# Patient Record
Sex: FEMALE | Race: WHITE | Employment: FULL TIME | URBAN - METROPOLITAN AREA
[De-identification: names, ages, dates, MRNs, and addresses within clinical notes are randomized per-mention and may not be internally consistent; named-entity substitution may affect disease eponyms.]

---

## 2019-08-07 ENCOUNTER — TELEPHONE (OUTPATIENT)
Dept: INTERNAL MEDICINE CLINIC | Age: 54
End: 2019-08-07

## 2019-08-07 NOTE — TELEPHONE ENCOUNTER
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 She called 6 months ago to see if we were accepting new pts and was told to call back in 6 months. She is still unable to get an appointment. She informed me her current PCP is retiring and she would really like to establish care with Dr. Gurmeet Dias. Please let me know if she can be set up for a NP appt. Thanks.

## 2020-03-18 ENCOUNTER — OFFICE VISIT (OUTPATIENT)
Dept: INTERNAL MEDICINE CLINIC | Age: 55
End: 2020-03-18

## 2020-03-18 VITALS
TEMPERATURE: 98.4 F | HEIGHT: 68 IN | DIASTOLIC BLOOD PRESSURE: 72 MMHG | WEIGHT: 136 LBS | RESPIRATION RATE: 12 BRPM | BODY MASS INDEX: 20.61 KG/M2 | OXYGEN SATURATION: 97 % | SYSTOLIC BLOOD PRESSURE: 128 MMHG | HEART RATE: 72 BPM

## 2020-03-18 DIAGNOSIS — Z00.00 WELL ADULT EXAM: Primary | ICD-10-CM

## 2020-03-18 RX ORDER — TAMOXIFEN CITRATE 20 MG/1
TABLET ORAL
COMMUNITY
Start: 2020-03-02

## 2020-03-18 RX ORDER — CHOLECALCIFEROL (VITAMIN D3) 50 MCG
CAPSULE ORAL
COMMUNITY

## 2020-03-18 RX ORDER — CHOLECALCIFEROL (VITAMIN D3) 125 MCG
CAPSULE ORAL
COMMUNITY

## 2020-03-18 NOTE — PROGRESS NOTES
Perla Murillo is a 48 y.o. female and presents with [facility]  . Subjective:  Patient presents to [facility]. Her prior primary care doctor moved. She reports overall good health in general.  She was diagnosed with breast cancer 2 years ago. Burning sensation in mouth  Patient is taking B12 complex  She also had a full work-up by ear nose and throat Dr. Gage Bey and then also was seen by Dr. Tono Vega did endoscopy. She had a negative evaluation and was started on Prilosec. She did Prilosec for 2 months but symptoms did not improve. She is just dealing with her symptoms. She notes that she has a persistent dry mouth. History of left breast cancer  Patient is getting mammogram every 6 months and MRI  Dr. Ari Bailon was seen by dr. Edenilson Espitia. She is taking buspar although prefers not to. She notes that it does take the edge off. 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 hoarseness  Respiratory:  negative for cough, hemoptysis, dyspnea,wheezing  CV:   negative for chest pain, palpitations, lower extremity edema  GI:   negative for nausea, vomiting, diarrhea, abdominal pain,melena  Endo:               negative for polyuria,polydipsia,polyphagia,heat intolerance  Genitourinary: negative for frequency, dysuria and hematuria  Integument:  negative for rash and pruritus  Hematologic:  negative for easy bruising and gum/nose bleeding  Musculoskel: negative for myalgias, arthralgias, back pain, muscle weakness, joint pain  Neurological:  negative for headaches, dizziness, vertigo, memory problems and gait   Behavl/Psych: negative for feelings of anxiety, depression, mood changes    Past Medical History:   Diagnosis Date    H/O endoscopy 2020    Hiatal hernia     History of breast cancer 2018    left breast    Weight decrease      Past Surgical History:   Procedure Laterality Date    HX BREAST LUMPECTOMY Left 2018    HX COLONOSCOPY  2017     Social History     Socioeconomic History    Marital status:      Spouse name: Not on file    Number of children: Not on file    Years of education: Not on file    Highest education level: Not on file   Tobacco Use    Smoking status: Never Smoker    Smokeless tobacco: Never Used   Substance and Sexual Activity    Alcohol use: No     Frequency: Never    Drug use: No    Sexual activity: Yes     Partners: Male     Family History   Problem Relation Age of Onset    Hypertension Mother     High Cholesterol Mother     Arthritis-osteo Mother     Hypertension Father     High Cholesterol Father     Prostate Cancer Father     Cancer Maternal Grandmother         lung    Cancer Paternal Grandmother         pancreatic    Stroke Maternal Grandfather      Current Outpatient Medications   Medication Sig Dispense Refill    tamoxifen (NOLVADEX) 20 mg tablet       cholecalciferol, vitamin D3, (Vitamin D3) 50 mcg (2,000 unit) tab Take  by mouth.  omega 3-dha-epa-fish oil (Fish Oil) 100-160-1,000 mg cap Take  by mouth.  b complex-vitamin c-folic acid (DIALYVITE) 100-1 mg tab tablet Take 1 Tab by mouth daily.  buspirone HCl (BUSPAR PO) Take 5 mg by mouth two (2) times a day.  PARoxetine (PAXIL) 30 mg tablet Take 30 mg by mouth nightly. Indications: not taking      phenol throat spray (CHLORASEPTIC THROAT SPRAY) 1.4 % spray Take 1 Spray by mouth as needed. (Patient taking differently: Take 1 Spray by mouth as needed. Indications: not taking) 180 mL 0    HYDROcodone-acetaminophen (NORCO) 7.5-325 mg per tablet Take 1 Tab by mouth every six (6) hours as needed for Pain. (Patient taking differently: Take 1 Tab by mouth every six (6) hours as needed for Pain. Indications: not taking) 8 Tab 0     No Known Allergies    Objective:  Visit Vitals  /72 (BP 1 Location: Left arm, BP Patient Position: Sitting)   Pulse 72   Temp 98.4 °F (36.9 °C) (Oral)   Resp 12   Ht 5' 8\" (1.727 m)   Wt 136 lb (61.7 kg)   SpO2 97%   BMI 20.68 kg/m²     Physical Exam:   General appearance - alert, well appearing, and in no distress  Mental status - alert, oriented to person, place, and time  EYE-MARTI, EOMI, corneas normal, no foreign bodies, visual acuity normal both eyes, no periorbital cellulitis  ENT-ENT exam normal, no neck nodes or sinus tenderness  Nose - normal and patent, no erythema, discharge or polyps  Mouth - mucous membranes moist, pharynx normal without lesions  Neck - supple, no significant adenopathy   Chest - clear to auscultation, no wheezes, rales or rhonchi, symmetric air entry   Heart - normal rate, regular rhythm, normal S1, S2, no murmurs, rubs, clicks or gallops   Abdomen - soft, nontender, nondistended, no masses or organomegaly  Lymph- no adenopathy palpable  Ext-peripheral pulses normal, no pedal edema, no clubbing or cyanosis  Skin-Warm and dry.  no hyperpigmentation, vitiligo, or suspicious lesions  Neuro -alert, oriented, normal speech, no focal findings or movement disorder noted  Neck-normal C-spine, no tenderness, full ROM without pain      Results for orders placed or performed in visit on 05/20/14   CULTURE, URINE   Result Value Ref Range    Urine Culture, Routine (A)      Beta hemolytic Streptococcus, group B  25,000-50,000 colony forming units per mL   URINALYSIS W/ RFLX MICROSCOPIC   Result Value Ref Range    Specific Gravity 1.025 1.005 - 1.030    pH (UA) 6.5 5.0 - 7.5    Color Yellow Yellow    Appearance Clear Clear    Leukocyte Esterase Negative Negative    Protein Negative Negative/Trace    Glucose Negative Negative    Ketone Negative Negative    Blood Negative Negative    Bilirubin Negative Negative    Urobilinogen 0.2 0.0 - 1.9 mg/dL    Nitrites Negative Negative    Microscopic Examination Comment      Prevention    Cardiovascular profile  History of radiation to left heart  Echo, EKg done by dr. Austin Due hx  Exercising:    Blood pressure:  Health healthy diet:  Diabetes:  Cholesterol:  Renal function:      Cancer risk profile  Cardio and weight lifting  Mammogram  PSA  Lung  Colonoscopy  Skin nonhealing in 2 weeks  Gyn abnormal bleeding/discharge/abd pain/pressure      Thyroid sx  Patient has history of anxiety we will check TSH    Osteopenia prevention  Calcium 1000mg/day yes  Vitamin D 800iu/day yes    Mental health scale: 8-9/10  Depression  Anxiety  Sleep # of hours:  Energy Level:        Immunizations  She is due for her Tdap  TDAP  Pneumonia vaccine  Flu vaccine  Shingles vaccine  HPV      Diagnoses and all orders for this visit:    1. Well adult exam  Patient appears in overall very good health. She is followed closely by Dr. Gerber Corcoran as well as her breast surgeon and GYN. She has a family history of cardiovascular disease. We will check her lipids. -     URINALYSIS W/ RFLX MICROSCOPIC; Future  -     LIPID PANEL; Future  -     METABOLIC PANEL, COMPREHENSIVE; Future  -     VITAMIN D, 25 HYDROXY; Future  -     TSH 3RD GENERATION; Future  -     CBC W/O DIFF;  Future  -     diphtheria-pertussis, acellular,-tetanus (ACELL) 2.5-8-5 Lf-mcg-Lf/0.5mL susp susp; 0.5 mL by IntraMUSCular route once for 1 dose.  -     REFERRAL TO DERMATOLOGY    Follow-up in 6 months or earlier if needed

## 2020-03-20 ENCOUNTER — TELEPHONE (OUTPATIENT)
Dept: INTERNAL MEDICINE CLINIC | Age: 55
End: 2020-03-20

## 2020-03-20 NOTE — TELEPHONE ENCOUNTER
Dr. Chely Villeda Received: Today Message Contents [employer] Records sent to [employer] 
 
  
 
General Message/Vendor Calls Caller's first and last name: Sierra Fairchild Reason for call: to be advised Callback required yes/no and why: yes Best contact number(s): [phone] Details to clarify the request: Pt stated that she was referred to a Dermatologist and she wanted to know if she had to call to schedule the appt or if the office was. She also wanted to know where would she be getting her Dtap vaccination done. Rasheed Krueger

## 2020-05-04 ENCOUNTER — TELEPHONE (OUTPATIENT)
Dept: INTERNAL MEDICINE CLINIC | Age: 55
End: 2020-05-04

## 2020-05-04 NOTE — TELEPHONE ENCOUNTER
----- Message from Benjamin Beck sent at 5/4/2020  3:36 PM EDT ----- Regarding: Dr. Lashaun Martinez telephone General Message/Vendor Calls Caller's first and last name: 
 
 
Reason for call: Pt would like to confirm her appt is still on for tomorrow Callback required yes/no and why:yes Best contact number(s): [phone] Details to clarify the request: 
 
 
Benjamin Beck

## 2020-05-05 ENCOUNTER — OFFICE VISIT (OUTPATIENT)
Dept: INTERNAL MEDICINE CLINIC | Age: 55
End: 2020-05-05

## 2020-05-05 VITALS
WEIGHT: 136 LBS | TEMPERATURE: 98.3 F | HEART RATE: 71 BPM | HEIGHT: 68 IN | BODY MASS INDEX: 20.61 KG/M2 | RESPIRATION RATE: 18 BRPM | OXYGEN SATURATION: 99 % | DIASTOLIC BLOOD PRESSURE: 69 MMHG | SYSTOLIC BLOOD PRESSURE: 106 MMHG

## 2020-05-05 DIAGNOSIS — Z00.00 WELL ADULT EXAM: ICD-10-CM

## 2020-05-05 DIAGNOSIS — F41.9 ANXIETY: ICD-10-CM

## 2020-05-05 DIAGNOSIS — K14.6 BURNING MOUTH SYNDROME: ICD-10-CM

## 2020-05-05 DIAGNOSIS — Z23 IMMUNIZATION DUE: Primary | ICD-10-CM

## 2020-05-05 NOTE — PROGRESS NOTES
Chief Complaint   Patient presents with    Immunization/Injection     TDAP       Patient is due for her Tdap. Burning mouth syndrome  Patient reports that she has been having burning in her mouth for the past year. She thought it was initially associated with her chemotherapy. She has been seen by ENT Dr. Zaheer Dillard as well as her dentist.  There has been no underlying cause discovered. She is chewing gum but that does not seem to help either. On discussion she does note that she is a mouth breather. She is on BuSpar 5 mg twice daily. She has not tried amitriptyline. She has not tried gabapentin. She notes that her symptoms are more consistent but inconsistent. Anxiety  Patient does not like to take medications. She is on 5 mg twice a day. She reports this medication helps her. She does not really want to take 3 times a day. She does not want to try amitriptyline primarily because she does not like medications. She acknowledges that she does have some anxiety.       Past Medical History:   Diagnosis Date    Cardiomyopathy [facility])     post partum cardiomyothy    H/O endoscopy 2020    Hemorrhoid     Hiatal hernia     History of breast cancer 2018    left breast, lumpectomy, radtiation no chemotherapy stage 1     Weight decrease      Past Surgical History:   Procedure Laterality Date    HX BREAST LUMPECTOMY Left 2018    HX COLONOSCOPY  2017    endoscopy with dr. Murrieta Maritza History     Socioeconomic History    Marital status:      Spouse name: Not on file    Number of children: Not on file    Years of education: Not on file    Highest education level: Not on file   Tobacco Use    Smoking status: Never Smoker    Smokeless tobacco: Never Used   Substance and Sexual Activity    Alcohol use: No     Frequency: Never    Drug use: No    Sexual activity: Yes     Partners: Male   Social History Narrative    Full Physical therapy            25 yo, 28 yo, 16 yo     Kids and  healthy     Family History   Problem Relation Age of Onset    Hypertension Mother     High Cholesterol Mother     Arthritis-osteo Mother     Hypertension Father     High Cholesterol Father     Prostate Cancer Father     Cancer Maternal Grandmother         lung    Cancer Paternal Grandmother         pancreatic    Stroke Maternal Grandfather      Current Outpatient Medications   Medication Sig Dispense Refill    tamoxifen (NOLVADEX) 20 mg tablet       cholecalciferol, vitamin D3, (Vitamin D3) 50 mcg (2,000 unit) tab Take  by mouth.  omega 3-dha-epa-fish oil (Fish Oil) 100-160-1,000 mg cap Take  by mouth.  b complex-vitamin c-folic acid (DIALYVITE) 100-1 mg tab tablet Take 1 Tab by mouth daily.  buspirone HCl (BUSPAR PO) Take 5 mg by mouth two (2) times a day. No Known Allergies    Review of Systems - General ROS: negative for - chills or fever  Cardiovascular ROS: no chest pain or dyspnea on exertion  Respiratory ROS: no cough, shortness of breath, or wheezing    Visit Vitals  /69 (BP 1 Location: Right arm, BP Patient Position: Sitting)   Pulse 71   Temp 98.3 °F (36.8 °C) (Oral)   Resp 18   Ht 5' 8\" (1.727 m)   Wt 136 lb (61.7 kg)   LMP 04/23/2020   SpO2 99%   BMI 20.68 kg/m²     General Appearance:  Well developed, well nourished,alert and oriented x 3, and individual in no acute distress. Ears/Nose/Mouth/Throat:   Hearing grossly normal.         Neck: Supple, no lad, no bruits   Chest:   Lungs clear to auscultation bilaterally. Cardiovascular:  Regular rate and rhythm, S1, S2 normal, no murmur. Abdomen:   Soft, non-tender, bowel sounds are active. Extremities: No edema bilaterally. Skin: Warm and dry, no suspicious lesions                 Diagnoses and all orders for this visit:    1. Immunization due  -     TETANUS, DIPHTHERIA TOXOIDS AND ACELLULAR PERTUSSIS VACCINE (TDAP), IN INDIVIDS. >=7, IM    2.  Burning mouth syndrome  Persistent problem not improving  We will check her B12  Discussed with patient that she may be a candidate for nortriptyline which may also help with anxiety as well. She defers this for now  We will find underlying causes for her burning mouth  She is a mouth breather and she may need extra saliva in her mouth and see if this helps. -     VITAMIN B12 & FOLATE; Future    3. Anxiety  Continue BuSpar twice a day. Offered her 3 times a day. She is also seeing Dr. Mae Salgado so he will discuss with her. She may benefit from nortriptyline. This was not a well adult exam however patient needs her labs reordered so she can have done. 4. Well adult exam  -     URINALYSIS W/ RFLX MICROSCOPIC; Future  -     LIPID PANEL; Future  -     METABOLIC PANEL, COMPREHENSIVE; Future  -     VITAMIN D, 25 HYDROXY; Future  -     TSH 3RD GENERATION; Future  -     CBC W/O DIFF;  Future

## 2020-05-15 LAB
25(OH)D3+25(OH)D2 SERPL-MCNC: 36.9 NG/ML (ref 30–100)
ALBUMIN SERPL-MCNC: 4.5 G/DL (ref 3.8–4.9)
ALBUMIN/GLOB SERPL: 1.6 {RATIO} (ref 1.2–2.2)
ALP SERPL-CCNC: 69 IU/L (ref 39–117)
ALT SERPL-CCNC: 26 IU/L (ref 0–32)
APPEARANCE UR: CLEAR
AST SERPL-CCNC: 25 IU/L (ref 0–40)
BACTERIA #/AREA URNS HPF: NORMAL /[HPF]
BILIRUB SERPL-MCNC: 0.5 MG/DL (ref 0–1.2)
BILIRUB UR QL STRIP: NEGATIVE
BUN SERPL-MCNC: 14 MG/DL (ref 6–24)
BUN/CREAT SERPL: 17 (ref 9–23)
CALCIUM SERPL-MCNC: 9.3 MG/DL (ref 8.7–10.2)
CASTS URNS QL MICRO: NORMAL /LPF
CHLORIDE SERPL-SCNC: 102 MMOL/L (ref 96–106)
CHOLEST SERPL-MCNC: 177 MG/DL (ref 100–199)
CO2 SERPL-SCNC: 22 MMOL/L (ref 20–29)
COLOR UR: YELLOW
CREAT SERPL-MCNC: 0.82 MG/DL (ref 0.57–1)
EPI CELLS #/AREA URNS HPF: NORMAL /HPF (ref 0–10)
ERYTHROCYTE [DISTWIDTH] IN BLOOD BY AUTOMATED COUNT: 12.6 % (ref 11.7–15.4)
FOLATE SERPL-MCNC: >20 NG/ML
GLOBULIN SER CALC-MCNC: 2.8 G/DL (ref 1.5–4.5)
GLUCOSE SERPL-MCNC: 98 MG/DL (ref 65–99)
GLUCOSE UR QL: NEGATIVE
HCT VFR BLD AUTO: 44.9 % (ref 34–46.6)
HDLC SERPL-MCNC: 68 MG/DL
HGB BLD-MCNC: 14.6 G/DL (ref 11.1–15.9)
HGB UR QL STRIP: NEGATIVE
INTERPRETATION, 910389: NORMAL
KETONES UR QL STRIP: NEGATIVE
LDLC SERPL CALC-MCNC: 93 MG/DL (ref 0–99)
LEUKOCYTE ESTERASE UR QL STRIP: ABNORMAL
MCH RBC QN AUTO: 30.2 PG (ref 26.6–33)
MCHC RBC AUTO-ENTMCNC: 32.5 G/DL (ref 31.5–35.7)
MCV RBC AUTO: 93 FL (ref 79–97)
MICRO URNS: ABNORMAL
NITRITE UR QL STRIP: NEGATIVE
PH UR STRIP: 7 [PH] (ref 5–7.5)
PLATELET # BLD AUTO: 310 X10E3/UL (ref 150–450)
POTASSIUM SERPL-SCNC: 4.4 MMOL/L (ref 3.5–5.2)
PROT SERPL-MCNC: 7.3 G/DL (ref 6–8.5)
PROT UR QL STRIP: NEGATIVE
RBC # BLD AUTO: 4.84 X10E6/UL (ref 3.77–5.28)
RBC #/AREA URNS HPF: NORMAL /HPF (ref 0–2)
SODIUM SERPL-SCNC: 139 MMOL/L (ref 134–144)
SP GR UR: 1.01 (ref 1–1.03)
TRIGL SERPL-MCNC: 81 MG/DL (ref 0–149)
TSH SERPL DL<=0.005 MIU/L-ACNC: 1.82 UIU/ML (ref 0.45–4.5)
UROBILINOGEN UR STRIP-MCNC: 0.2 MG/DL (ref 0.2–1)
VIT B12 SERPL-MCNC: 804 PG/ML (ref 232–1245)
VLDLC SERPL CALC-MCNC: 16 MG/DL (ref 5–40)
WBC # BLD AUTO: 8.8 X10E3/UL (ref 3.4–10.8)
WBC #/AREA URNS HPF: NORMAL /HPF (ref 0–5)

## 2020-05-29 ENCOUNTER — TELEPHONE (OUTPATIENT)
Dept: INTERNAL MEDICINE CLINIC | Age: 55
End: 2020-05-29

## 2020-05-29 NOTE — TELEPHONE ENCOUNTER
----- Message from Gwendolyn Landa sent at 5/29/2020  3:03 PM EDT -----  Regarding: Dr. Darrian Chawla: [phone]  Caller's first and last name and relationship (if not the patient):  Best contact number(s): [phone]  Whose call is being returned: n/a  Details to clarify the request:    PT stated her labs results have not been provided as of yet. Labs done 2 weeks ago. Pt requesting callback.

## 2020-06-01 NOTE — TELEPHONE ENCOUNTER
returned call to pt, no answer, left detailed message with results and recommendations as noted on patient letter on personal voicemail

## 2020-11-11 ENCOUNTER — OFFICE VISIT (OUTPATIENT)
Dept: INTERNAL MEDICINE CLINIC | Age: 55
End: 2020-11-11
Payer: COMMERCIAL

## 2020-11-11 VITALS
RESPIRATION RATE: 12 BRPM | HEIGHT: 68 IN | WEIGHT: 140 LBS | BODY MASS INDEX: 21.22 KG/M2 | SYSTOLIC BLOOD PRESSURE: 115 MMHG | OXYGEN SATURATION: 99 % | DIASTOLIC BLOOD PRESSURE: 75 MMHG | TEMPERATURE: 98.4 F | HEART RATE: 81 BPM

## 2020-11-11 DIAGNOSIS — Z85.3 HISTORY OF BREAST CANCER: ICD-10-CM

## 2020-11-11 DIAGNOSIS — R19.4 CHANGE IN BOWEL HABITS: Primary | ICD-10-CM

## 2020-11-11 DIAGNOSIS — F43.9 SITUATIONAL STRESS: ICD-10-CM

## 2020-11-11 PROCEDURE — 99213 OFFICE O/P EST LOW 20 MIN: CPT | Performed by: INTERNAL MEDICINE

## 2020-11-11 NOTE — PROGRESS NOTES
Diagnoses and all orders for this visit:  Diagnoses and all orders for this visit:    1. Change in bowel habits  History of rectal bleed in June and now with change in bowel habits  Her last colonscopy was in 2017  Diet reviewed and no new change but drinking Kombucha  Will check labs and request reevaluation  -     CBC W/O DIFF; Future  -     IRON PROFILE; Future  -     FERRITIN; Future  -     REFERRAL TO GASTROENTEROLOGY    2. History of breast cancer  Follow up with Dr. Plasencia Seen    3. Situational stress  Cont buspar prn  stable          Chief Complaint   Patient presents with    Other     Rectal area when cleaning feels like an open sore and is irrtaed - stool is differnt      Change in bowel habits  Pt reprots her bm are not as firm, very small, not balls, loose small and narrow stools. She noticed about 4-5 months and not resolving. No abdominal pain, no NV or constipation  She is drinking smoothie with Kale and fruit for 2 years. She has been drinking homemade Kombucha  home made with black tea and berry flavored, mildly fermented    She notes also irritation at rectal area. She feels a sore area at rectal area. She has been drinking Kombucha,  No fevers, night sweats are not new  Menses every 30 days. Per chart review pt had rectal bleed in 6/2020. Sx resolved. Last colonscopy 2017 with Dr. German Shaikh level and covered with Buspar bid. She sees Dr. Lb Dan. Hx of breast cancer  Held tamoxifen due to possibly burning mouth syndrome.  She is still on tamoxifen        Past Medical History:   Diagnosis Date    Cardiomyopathy [facility])     post partum cardiomyothy    H/O endoscopy 2020    Hemorrhoid     Hiatal hernia     History of breast cancer 2018    left breast, lumpectomy, radtiation no chemotherapy stage 1     Weight decrease      Past Surgical History:   Procedure Laterality Date    HX BREAST LUMPECTOMY Left 2018    HX COLONOSCOPY  2017    endoscopy with dr. Yolanda Cavanaugh History     Socioeconomic History    Marital status:      Spouse name: Not on file    Number of children: Not on file    Years of education: Not on file    Highest education level: Not on file   Tobacco Use    Smoking status: Never Smoker    Smokeless tobacco: Never Used   Substance and Sexual Activity    Alcohol use: No     Frequency: Never    Drug use: No    Sexual activity: Yes     Partners: Male   Social History Narrative    Full Physical therapy            25 yo, 28 yo, 16 yo     Kids and  healthy     Family History   Problem Relation Age of Onset    Hypertension Mother     High Cholesterol Mother     Arthritis-osteo Mother     Hypertension Father     High Cholesterol Father     Prostate Cancer Father     Cancer Maternal Grandmother         lung    Cancer Paternal Grandmother         pancreatic    Stroke Maternal Grandfather      Current Outpatient Medications   Medication Sig Dispense Refill    tamoxifen (NOLVADEX) 20 mg tablet       cholecalciferol, vitamin D3, (Vitamin D3) 50 mcg (2,000 unit) tab Take  by mouth.  omega 3-dha-epa-fish oil (Fish Oil) 100-160-1,000 mg cap Take  by mouth.  b complex-vitamin c-folic acid (DIALYVITE) 100-1 mg tab tablet Take 1 Tab by mouth daily.  buspirone HCl (BUSPAR PO) Take 5 mg by mouth two (2) times a day.        No Known Allergies    Review of Systems - General ROS: negative for - chills or fever  Cardiovascular ROS: no chest pain or dyspnea on exertion  Respiratory ROS: no cough, shortness of breath, or wheezing    Visit Vitals  /75 (BP 1 Location: Right arm, BP Patient Position: Sitting)   Pulse 81   Temp 98.4 °F (36.9 °C) (Oral)   Resp 12   Ht 5' 8\" (1.727 m)   Wt 140 lb (63.5 kg)   LMP 11/05/2020   SpO2 99%   BMI 21.29 kg/m²           Constitutional: [x] Appears well-developed and well-nourished [x] No apparent distress      [] Abnormal -     Abd: postive bowel sounds, no tenderness on palpation  Rectal heme negative, + external hemorroids  No masses on exam      Mental status: [x] Alert and awake  [x] Oriented to person/place/time [x] Able to follow commands    [] Abnormal -     Eyes:   EOM    [x]  Normal    [] Abnormal -   Sclera  [x]  Normal    [] Abnormal -          Discharge [x]  None visible   [] Abnormal -     HENT: [x] Normocephalic, atraumatic  [] Abnormal -   [x] Mouth/Throat: Mucous membranes are moist    External Ears [x] Normal  [] Abnormal -    Neck: [x] No visualized mass [] Abnormal -     Pulmonary/Chest: [x] Respiratory effort normal   [x] No visualized signs of difficulty breathing or respiratory distress        [] Abnormal -      Musculoskeletal:   [x] Normal gait with no signs of ataxia         [x] Normal range of motion of neck        [] Abnormal -     Neurological:        [x] No Facial Asymmetry (Cranial nerve 7 motor function) (limited exam due to video visit)          [x] No gaze palsy        [] Abnormal -          Skin:        [x] No significant exanthematous lesions or discoloration noted on facial skin         [] Abnormal -            Psychiatric:       [x] Normal Affect [] Abnormal -        [x] No Hallucinations

## 2020-11-12 LAB
ERYTHROCYTE [DISTWIDTH] IN BLOOD BY AUTOMATED COUNT: 12.7 % (ref 11.5–14.5)
FERRITIN SERPL-MCNC: 53 NG/ML (ref 26–388)
HCT VFR BLD AUTO: 39.4 % (ref 35–47)
HGB BLD-MCNC: 13.1 G/DL (ref 11.5–16)
IRON SATN MFR SERPL: 24 % (ref 20–50)
IRON SERPL-MCNC: 103 UG/DL (ref 35–150)
MCH RBC QN AUTO: 31.2 PG (ref 26–34)
MCHC RBC AUTO-ENTMCNC: 33.2 G/DL (ref 30–36.5)
MCV RBC AUTO: 93.8 FL (ref 80–99)
NRBC # BLD: 0 K/UL (ref 0–0.01)
NRBC BLD-RTO: 0 PER 100 WBC
PLATELET # BLD AUTO: 276 K/UL (ref 150–400)
PMV BLD AUTO: 11.5 FL (ref 8.9–12.9)
RBC # BLD AUTO: 4.2 M/UL (ref 3.8–5.2)
TIBC SERPL-MCNC: 427 UG/DL (ref 250–450)
WBC # BLD AUTO: 8.8 K/UL (ref 3.6–11)

## 2021-03-15 ENCOUNTER — NURSE TRIAGE (OUTPATIENT)
Dept: OTHER | Facility: CLINIC | Age: 56
End: 2021-03-15

## 2021-03-15 NOTE — TELEPHONE ENCOUNTER
Reason for Disposition  Mild hoarseness Answer Assessment - Initial Assessment Questions 1. DESCRIPTION: \"Describe your voice. \" 
    Scratchy throat 2. ONSET: \"When did the hoarseness begin? \" 
    2 days 3. COUGH: \"Is there a cough? \" If so, ask: \"How bad? \" No 
 
4. FEVER: \"Do you have a fever? \" If so, ask: \"What is your temperature, how was it measured, and when did it start? \" No 
 
5. RESPIRATORY STATUS: \"Describe your breathing. \" No problems 6. ALLERGIES: \"Any allergy symptoms? \" If so, ask: \"What are they? \" Unknown if this is allergic- seasonal 
 
7. IRRITANTS: \"Do you smoke? \" \"Have you been exposed to any irritating fumes? \" No 
 
8. CAUSE: \"What do you think is causing the hoarseness? \" 
    unknown 9. OTHER SYMPTOMS: \"Do you have any other symptoms? \" (e.g., sore throat, swelling, foreign body, rash) None 10. PREGNANCY: \"Is there any chance you are pregnant? \" \"When was your last menstrual period? \" N/a Protocols used: [id number] Patient called [facility] with red flag complaint. Call received from [place] Brief description of triage:scratchy throat and has been vaccinated x2. Wants appointment as soon as available. Triage indicates for patient to be seen today or tomorrow Care advice provided, patient verbalizes understanding; denies any other questions or concerns; instructed to call back for any new or worsening symptoms. Writer provided warm transfer to [place] at [facility] for appointment scheduling. Attention Provider: Thank you for allowing me to participate in the care of your patient. The patient was connected to triage from [facility]. Please do not respond through this encounter as the response is not directed to a shared pool.

## 2021-08-26 ENCOUNTER — OFFICE VISIT (OUTPATIENT)
Dept: INTERNAL MEDICINE CLINIC | Age: 56
End: 2021-08-26
Payer: COMMERCIAL

## 2021-08-26 VITALS
WEIGHT: 136 LBS | BODY MASS INDEX: 20.61 KG/M2 | HEIGHT: 68 IN | TEMPERATURE: 98.2 F | SYSTOLIC BLOOD PRESSURE: 111 MMHG | OXYGEN SATURATION: 97 % | DIASTOLIC BLOOD PRESSURE: 72 MMHG | HEART RATE: 86 BPM | RESPIRATION RATE: 18 BRPM

## 2021-08-26 DIAGNOSIS — R00.2 PALPITATION: ICD-10-CM

## 2021-08-26 DIAGNOSIS — Z00.00 WELL ADULT EXAM: Primary | ICD-10-CM

## 2021-08-26 PROCEDURE — 99396 PREV VISIT EST AGE 40-64: CPT | Performed by: INTERNAL MEDICINE

## 2021-08-26 RX ORDER — MIRTAZAPINE 7.5 MG/1
7.5 TABLET, FILM COATED ORAL
COMMUNITY

## 2021-08-26 NOTE — PROGRESS NOTES
Octavia Her is a 49 y.o. female and presents with Complete Physical  .      Subjective:    Patient is here for her annual.  She reports overall good health but she does have some concerns. She has a history of left breast cancer has been followed by Dr. Yon Arellano. Vitamin D deficiency  She was told to take vitamin D to decrease her risk for breast cancer again. She has been taking 2000 international units/day. She is curious if she should be taking calcium as well. Discussed with patient and she should be taking s about 1000 mg of calcium in her diet. He is currently not taking any supplements. Periumbilical hernia  Patient reports when she is eating [place] food like [employer] her abdominal will protrude. She notes that she is able to reduce it. At times it causes pain. She would like to have it evaluated. Rectal bleed  colonscopy normal  hemorroids  Changed diet and no further bleeding    Gyn exam  Stings with water in       Left breast cancer   As noted she is followed by Dr. Edenilson Jackson  Occasionally catching of left breast  Will have MRI of breast next week      PVCs  She has been having fluttering in her chest.  It occurs only when she is at rest.  She exercises but not experience it during this time. She was seen by Dr. Ralf Mcdonnell in the past.  Her last echo was 2 years ago. She is not having any chest pain or shortness of breath. She denies fluid in her legs. .    Anxiety  Pt was seen by dr. Dell Duque. She is taking BuSpar in the evening but should be taking twice a day. 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 Review of Systems  Constitutional: negative for fevers, chills, anorexia and weight loss  Eyes:   negative for visual disturbance and irritation  ENT:   negative for tinnitus,sore throat,nasal congestion,ear pains. hoarseness  Respiratory:  negative for cough, hemoptysis, dyspnea,wheezing  CV:   negative for chest pain, palpitations, lower extremity edema  GI:   negative for nausea, vomiting, diarrhea, abdominal pain,melena  Endo:               negative for polyuria,polydipsia,polyphagia,heat intolerance  Genitourinary: negative for frequency, dysuria and hematuria  Integument:  negative for rash and pruritus  Hematologic:  negative for easy bruising and gum/nose bleeding  Musculoskel: negative for myalgias, arthralgias, back pain, muscle weakness, joint pain  Neurological:  negative for headaches, dizziness, vertigo, memory problems and gait   Behavl/Psych: negative for feelings of anxiety, depression, mood changes    Past Medical History:   Diagnosis Date    Cardiomyopathy ([facility] [address]     post partum cardiomyothy    H/O endoscopy 2020    Hemorrhoid     Hiatal hernia     History of breast cancer 2018    left breast, lumpectomy, radtiation no chemotherapy stage 1     Weight decrease      Past Surgical History:   Procedure Laterality Date    HX BREAST LUMPECTOMY Left 2018    HX COLONOSCOPY  2017    endoscopy with dr. 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            28 yo, 24 yo, 17 yo     Kids and  healthy     Social Determinants of Health     Financial Resource Strain:     Difficulty of Paying Living Expenses:    Food Insecurity:     Worried About Running Out of Food in the Last Year:     Ran Out of Food in the Last Year:    Transportation Needs:     Lack of Transportation (Medical):      Lack of Transportation (Non-Medical):    Physical Activity:     Days of Exercise per Week:     Minutes of Exercise per Session:    Stress:     Feeling of Stress :    Social Connections:     Frequency of Communication with Friends and Family:     Frequency of Social Gatherings with Friends and Family:     Attends [employer] Services:     Active Member of Clubs or Organizations:     Attends Club or Organization Meetings:     Marital Status:      Family History   Problem Relation Age of Onset    Hypertension Mother     High Cholesterol Mother     Arthritis-osteo Mother     Hypertension Father     High Cholesterol Father     Prostate Cancer Father     Cancer Maternal Grandmother         lung    Cancer Paternal Grandmother         pancreatic    Stroke Maternal Grandfather      Current Outpatient Medications   Medication Sig Dispense Refill    mirtazapine (REMERON) 7.5 mg tablet Take 7.5 mg by mouth nightly.  tamoxifen (NOLVADEX) 20 mg tablet       cholecalciferol, vitamin D3, (Vitamin D3) 50 mcg (2,000 unit) tab Take  by mouth.  omega 3-dha-epa-fish oil (Fish Oil) 100-160-1,000 mg cap Take  by mouth.  b complex-vitamin c-folic acid (DIALYVITE) 100-1 mg tab tablet Take 1 Tab by mouth daily.  buspirone HCl (BUSPAR PO) Take 5 mg by mouth two (2) times a day.        No Known Allergies    Objective:  Visit Vitals  /72 (BP 1 Location: Left upper arm, BP Patient Position: Sitting)   Pulse 86   Temp 98.2 °F (36.8 °C) (Oral)   Resp 18   Ht 5' 8\" (1.727 m)   Wt 136 lb (61.7 kg)   LMP 08/04/2021 (Approximate)   SpO2 97%   BMI 20.68 kg/m²     Physical Exam:   General appearance - alert, well appearing, and in no distress  Mental status - alert, oriented to person, place, and time  EYE-MARTI, EOMI, corneas normal, no foreign bodies, visual acuity normal both eyes, no periorbital cellulitis  ENT-ENT exam normal, no neck nodes or sinus tenderness  Nose - normal and patent, no erythema, discharge or polyps  Mouth - mucous membranes moist, pharynx normal without lesions  Neck - supple, no significant adenopathy   Chest - clear to auscultation, no wheezes, rales or rhonchi, symmetric air entry   Heart - normal rate, regular rhythm, normal S1, S2, no murmurs, rubs, clicks or gallops   Abdomen - soft, nontender, nondistended, no masses or organomegaly  Lymph- no adenopathy palpable  Ext-peripheral pulses normal, no pedal edema, no clubbing or cyanosis  Skin-Warm and dry.  no hyperpigmentation, vitiligo, or suspicious lesions  Neuro -alert, oriented, normal speech, no focal findings or movement disorder noted  Neck-normal C-spine, no tenderness, full ROM without pain      Results for orders placed or performed in visit on 11/11/20   FERRITIN   Result Value Ref Range    Ferritin 53 26 - 388 NG/ML   IRON PROFILE   Result Value Ref Range    Iron 103 35 - 150 ug/dL    TIBC 427 250 - 450 ug/dL    Iron % saturation 24 20 - 50 %   CBC W/O DIFF   Result Value Ref Range    WBC 8.8 3.6 - 11.0 K/uL    RBC 4.20 3.80 - 5.20 M/uL    HGB 13.1 11.5 - 16.0 g/dL    HCT 39.4 35.0 - 47.0 %    MCV 93.8 80.0 - 99.0 FL    MCH 31.2 26.0 - 34.0 PG    MCHC 33.2 30.0 - 36.5 g/dL    RDW 12.7 11.5 - 14.5 %    PLATELET 699 799 - 453 K/uL    MPV 11.5 8.9 - 12.9 FL    NRBC 0.0 0  WBC    ABSOLUTE NRBC 0.00 0.00 - 0.01 K/uL     Prevention    Cardiovascular profile  History of radiation to left heart  Echo, EKg done by dr. Hamilton Hernandez hx  Exercising:    Blood pressure:  Health healthy diet:  Diabetes:  Cholesterol:  Renal function:      Cancer risk profile  Cardio and weight lifting  Mammogram  PSA  Lung  Colonoscopy  Skin nonhealing in 2 weeks  Gyn abnormal bleeding/discharge/abd pain/pressure      Thyroid sx  Patient has history of anxiety we will check TSH    Osteopenia prevention  Calcium 1000mg/day yes  Vitamin D 800iu/day yes    Mental health scale: 8-9/10  Depression  Anxiety  Sleep # of hours:  Energy Level:        Immunizations  She is due for her Tdap  TDAP  Pneumonia vaccine  Flu vaccine  Shingles vaccine  HPV      Diagnoses and all orders for this visit:    1. Well adult exam  Patient appears in overall very good health. Follow-up with Dr. Rama Resendez for breast cancer follow-up  Follow-up with GYN as scheduled  Follow-up with psychiatry and encouraged her to take the BuSpar twice daily   -     METABOLIC PANEL, COMPREHENSIVE; Future  -     VITAMIN D, 25 HYDROXY; Future  -     LIPID PANEL; Future  -     REFERRAL TO GENERAL SURGERY  -     REFERRAL TO DERMATOLOGY  -     [facility]; Future    2. Palpitation  We will check TSH with labs  If persists will follow up with [facility]  -     TSH 3RD GENERATION; Future    Follow-up in 1 year or earlier if needed.

## 2021-08-29 ENCOUNTER — HOSPITAL ENCOUNTER (OUTPATIENT)
Dept: ULTRASOUND IMAGING | Age: 56
Discharge: HOME | End: 2021-08-29
Attending: INTERNAL MEDICINE
Payer: COMMERCIAL

## 2021-08-29 DIAGNOSIS — Z00.00 WELL ADULT EXAM: ICD-10-CM

## 2021-08-29 PROCEDURE — 76705 ECHO EXAM OF ABDOMEN: CPT

## 2021-08-31 LAB
25(OH)D3+25(OH)D2 SERPL-MCNC: 39.2 NG/ML (ref 30–100)
ALBUMIN SERPL-MCNC: 3.9 G/DL (ref 3.8–4.9)
ALBUMIN/GLOB SERPL: 1.3 {RATIO} (ref 1.2–2.2)
ALP SERPL-CCNC: 63 IU/L (ref 48–121)
ALT SERPL-CCNC: 45 IU/L (ref 0–32)
AST SERPL-CCNC: 38 IU/L (ref 0–40)
BILIRUB SERPL-MCNC: 0.4 MG/DL (ref 0–1.2)
BUN SERPL-MCNC: 14 MG/DL (ref 6–24)
BUN/CREAT SERPL: 15 (ref 9–23)
CALCIUM SERPL-MCNC: 8.7 MG/DL (ref 8.7–10.2)
CHLORIDE SERPL-SCNC: 106 MMOL/L (ref 96–106)
CHOLEST SERPL-MCNC: 154 MG/DL (ref 100–199)
CO2 SERPL-SCNC: 22 MMOL/L (ref 20–29)
CREAT SERPL-MCNC: 0.92 MG/DL (ref 0.57–1)
GLOBULIN SER CALC-MCNC: 3.1 G/DL (ref 1.5–4.5)
GLUCOSE SERPL-MCNC: 93 MG/DL (ref 65–99)
HDLC SERPL-MCNC: 56 MG/DL
LDLC SERPL CALC-MCNC: 86 MG/DL (ref 0–99)
POTASSIUM SERPL-SCNC: 4.5 MMOL/L (ref 3.5–5.2)
PROT SERPL-MCNC: 7 G/DL (ref 6–8.5)
SODIUM SERPL-SCNC: 141 MMOL/L (ref 134–144)
TRIGL SERPL-MCNC: 59 MG/DL (ref 0–149)
TSH SERPL DL<=0.005 MIU/L-ACNC: 1.39 UIU/ML (ref 0.45–4.5)
VLDLC SERPL CALC-MCNC: 12 MG/DL (ref 5–40)

## 2021-09-03 DIAGNOSIS — R74.8 LIVER ENZYME ELEVATION: Primary | ICD-10-CM

## 2021-09-07 ENCOUNTER — TELEPHONE (OUTPATIENT)
Dept: INTERNAL MEDICINE CLINIC | Age: 56
End: 2021-09-07

## 2021-09-07 NOTE — TELEPHONE ENCOUNTER
----- Message from Darin Nascimento MD sent at 9/3/2021 11:48 PM EDT -----  Please send patient her CMP to recheck her liver. We will check in 2 to 3 months.

## 2021-09-08 ENCOUNTER — TELEPHONE (OUTPATIENT)
Dept: INTERNAL MEDICINE CLINIC | Age: 56
End: 2021-09-08

## 2021-09-08 DIAGNOSIS — R74.8 ELEVATED LIVER ENZYMES: Primary | ICD-10-CM

## 2021-09-08 NOTE — TELEPHONE ENCOUNTER
----- Message from Norbert Yun sent at 9/8/2021  8:06 AM EDT -----  Regarding: mary/telephone  Contact: [phone]  General Message/Vendor Calls    Caller's first and last name:micki Dash      Reason for call:Patient has questions concerning blood work results      Callback required yes/no and why:yes      Best contact number(s):[phone]      Details to clarify the request:      Norbert Yun

## 2021-09-08 NOTE — TELEPHONE ENCOUNTER
Maria C Vang from the breast center is calling to say patient had an ultrasound of her R breast but she needs a biopsy on two different sites on that breast also. If we want her to have this done there they need a new order for this.

## 2021-09-08 NOTE — TELEPHONE ENCOUNTER
I tried to call pt to let her know re: hepatic panel. Got her Vm. She does not have mychart. Please let her knwow.  Thanks, rigo

## 2021-09-08 NOTE — TELEPHONE ENCOUNTER
Patient wants to know exactly which liver enzyme is elevated in her panel.  States she needs to know so she can pass the info onto another doctor

## 2021-09-08 NOTE — TELEPHONE ENCOUNTER
Yes please. In general if radiology is requesting additional evaluation or biopsy please allow. Please have them CC her breast oncologist with the results. Thank you.

## 2021-09-08 NOTE — TELEPHONE ENCOUNTER
I spoke with patient, she is concerned about her elevated liver test and wants to discuss her results with pcp. I added pt on for a VV this Friday 9/10 at 8am. Pt was very thankful for the VV appt.

## 2021-09-09 NOTE — TELEPHONE ENCOUNTER
I spoke with patient, she received pcp's voicemail last night. She would like to keep her VV for tomorrow to discuss results. She would like to wait to have any test done until she talks with pcp tomorrow. Pt was thankful for the return call.

## 2021-09-10 ENCOUNTER — VIRTUAL VISIT (OUTPATIENT)
Dept: INTERNAL MEDICINE CLINIC | Age: 56
End: 2021-09-10
Payer: COMMERCIAL

## 2021-09-10 DIAGNOSIS — R74.8 ELEVATED LIVER ENZYMES: Primary | ICD-10-CM

## 2021-09-10 PROCEDURE — 99213 OFFICE O/P EST LOW 20 MIN: CPT | Performed by: INTERNAL MEDICINE

## 2021-09-10 NOTE — PROGRESS NOTES
Diagnoses and all orders for this visit:    1. Elevated liver enzymes  Patient presents for evaluation of her liver enzyme ALT  Per chart review in 2012 her ALT was 42 and most recently checked at 39  Discussed with patient that normal parameters had changed in this interval and her 45 was flagged as abnormal.    She has a history of breast cancer which was early stage lymph node negative and recent MRI was also negative of her breast.  She is being followed by Dr. Jose Burton. Discussed with patient and possible lab error, no history of alcohol but possibly hepatic steatosis. Mother with hepatic steatosis but patient's BMI is 20. We will check liver proteins and if ALT remains slightly elevated or other hepatic markers elevated will do liver ultrasound. If ultrasound negative then recheck in 3 months. We discussed having her follow-up with hepatology if needed in the future. HEPATIC FUNCTION PANEL; Future  -     GGT; Future  -     CBC W/O DIFF; Future  -     HEPATITIS C AB; Future  -     HEP B SURFACE AB; Future           Chief Complaint   Patient presents with    Results       Patient presents for discussion of her elevated liver proteins ALT. Chart reviewed and her ALT was 42 in 2012.  2021 parameters have changed and now ALT is considered elevated. She is not taking any over-the-counter medications or herbal medications which would affect the liver. She is taking B complex, vitamin D3. We reviewed her prescription medications and none would increase liver as well. She notes history of early breast cancer and lymph node negative. She is being followed by  and recent MRI was negative as well.   Past Medical History:   Diagnosis Date    Cardiomyopathy [facility])     post partum cardiomyothy    H/O endoscopy 2020    Hemorrhoid     Hiatal hernia     History of breast cancer 2018    left breast, lumpectomy, radtiation no chemotherapy stage 1 , dr. Riky Luque     Past Surgical History: Procedure Laterality Date    HX BREAST LUMPECTOMY Left 2018    HX COLONOSCOPY  2017    endoscopy with dr. 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     Kids and  healthy     Social Determinants of Health     Financial Resource Strain:     Difficulty of Paying Living Expenses:    Food Insecurity:     Worried About Running Out of Food in the Last Year:     Ran Out of Food in the Last Year:    Transportation Needs:     Lack of Transportation (Medical):  Lack of Transportation (Non-Medical):    Physical Activity:     Days of Exercise per Week:     Minutes of Exercise per Session:    Stress:     Feeling of Stress :    Social Connections:     Frequency of Communication with Friends and Family:     Frequency of Social Gatherings with Friends and Family:     Attends [employer] Services:     Active Member of Clubs or Organizations:     Attends Club or Organization Meetings:     Marital Status:      Family History   Problem Relation Age of Onset    Hypertension Mother     High Cholesterol Mother     Arthritis-osteo Mother     Hypertension Father     High Cholesterol Father     Prostate Cancer Father     Cancer Maternal Grandmother         lung    Cancer Paternal Grandmother         pancreatic    Stroke Maternal Grandfather      Current Outpatient Medications   Medication Sig Dispense Refill    mirtazapine (REMERON) 7.5 mg tablet Take 7.5 mg by mouth nightly.       tamoxifen (NOLVADEX) 20 mg tablet  cholecalciferol, vitamin D3, (Vitamin D3) 50 mcg (2,000 unit) tab Take  by mouth.  omega 3-dha-epa-fish oil (Fish Oil) 100-160-1,000 mg cap Take  by mouth.  b complex-vitamin c-folic acid (DIALYVITE) 100-1 mg tab tablet Take 1 Tab by mouth daily.  buspirone HCl (BUSPAR PO) Take 5 mg by mouth two (2) times a day. No Known Allergies    Review of Systems - General ROS: negative for - chills or fever  Cardiovascular ROS: no chest pain or dyspnea on exertion  Respiratory ROS: no cough, shortness of breath, or wheezing    There were no vitals taken for this visit. Constitutional: [x] Appears well-developed and well-nourished [x] No apparent distress      [] Abnormal -     Mental status: [x] Alert and awake  [x] Oriented to person/place/time [x] Able to follow commands    [] Abnormal -     Eyes:   EOM    [x]  Normal    [] Abnormal -   Sclera  [x]  Normal    [] Abnormal -          Discharge [x]  None visible   [] Abnormal -     HENT: [x] Normocephalic, atraumatic  [] Abnormal -   [x] Mouth/Throat: Mucous membranes are moist    External Ears [x] Normal  [] Abnormal -    Neck: [x] No visualized mass [] Abnormal -     Pulmonary/Chest: [x] Respiratory effort normal   [x] No visualized signs of difficulty breathing or respiratory distress        [] Abnormal -      Musculoskeletal:   [x] Normal gait with no signs of ataxia         [x] Normal range of motion of neck        [] Abnormal -     Neurological:        [x] No Facial Asymmetry (Cranial nerve 7 motor function) (limited exam due to video visit)          [x] No gaze palsy        [] Abnormal -          Skin:        [x] No significant exanthematous lesions or discoloration noted on facial skin         [] Abnormal -            Psychiatric:       [x] Normal Affect [] Abnormal -        [x] No Hallucinations      ATTENTION:   This medical record was transcribed using an electronic medical records/speech recognition system.   Although proofread, it may and can contain electronic, spelling and other errors. Corrections may be executed at a later time. Please contact us for any clarifications as needed. On this date 09/10/21  I have spent 28minutes reviewing previous notes, test results and face to face with the patient discussing the diagnosis and importance of compliance with the treatment plan as well as documenting on the day of the visit. Video this is a virtual visit. I was located in my office and the patient was located in his/her home. Pt has given consent and aware this visit will be billed to his/her health insurance.

## 2021-09-10 NOTE — PROGRESS NOTES
Chief Complaint   Patient presents with    Results     1. Have you been to the ER, urgent care clinic since your last visit? Hospitalized since your last visit? No     2. Have you seen or consulted any other health care providers outside of the [address] [place] [address] since your last visit? Include any pap smears or colon screening.  No

## 2021-09-11 LAB
ALBUMIN SERPL-MCNC: 4.4 G/DL (ref 3.8–4.9)
ALP SERPL-CCNC: 73 IU/L (ref 48–121)
ALT SERPL-CCNC: 25 IU/L (ref 0–32)
AST SERPL-CCNC: 27 IU/L (ref 0–40)
BILIRUB DIRECT SERPL-MCNC: 0.12 MG/DL (ref 0–0.4)
BILIRUB SERPL-MCNC: 0.3 MG/DL (ref 0–1.2)
ERYTHROCYTE [DISTWIDTH] IN BLOOD BY AUTOMATED COUNT: 12.3 % (ref 11.7–15.4)
GGT SERPL-CCNC: 27 IU/L (ref 0–60)
HBV SURFACE AB SER QL: REACTIVE
HCT VFR BLD AUTO: 41.9 % (ref 34–46.6)
HCV AB S/CO SERPL IA: <0.1 S/CO RATIO (ref 0–0.9)
HGB BLD-MCNC: 14 G/DL (ref 11.1–15.9)
MCH RBC QN AUTO: 30.5 PG (ref 26.6–33)
MCHC RBC AUTO-ENTMCNC: 33.4 G/DL (ref 31.5–35.7)
MCV RBC AUTO: 91 FL (ref 79–97)
PLATELET # BLD AUTO: 283 X10E3/UL (ref 150–450)
PROT SERPL-MCNC: 7.2 G/DL (ref 6–8.5)
RBC # BLD AUTO: 4.59 X10E6/UL (ref 3.77–5.28)
WBC # BLD AUTO: 8.9 X10E3/UL (ref 3.4–10.8)

## 2022-01-07 ENCOUNTER — TELEPHONE (OUTPATIENT)
Dept: INTERNAL MEDICINE CLINIC | Age: 57
End: 2022-01-07

## 2022-01-07 NOTE — TELEPHONE ENCOUNTER
PSR reached out to patient - she told [facility] to have all her symptoms checked.  She did make an appointment for 01/19/2022 at 2 PM for reoccurring UTI issues

## 2022-01-07 NOTE — TELEPHONE ENCOUNTER
----- Message from Camilo Bragg sent at 1/7/2022  7:51 AM EST -----  Subject: Appointment Request    Reason for Call: Urgent Adult Urinary Problem    QUESTIONS  Type of Appointment? Established Patient  Reason for appointment request? Available appointments did not meet   patient need  Additional Information for Provider? Pt of Bhavin Meza   experiencing UTI symptoms. Stated also started with chills last night. Offered to scheduled VV for today, however prefers to be seen in person as   urine test may be needed. Also would like to know if COVID test offered at   practice before setting up appt. Screened red due to chills. ---------------------------------------------------------------------------  --------------  [employer] INFO  What is the best way for the office to contact you? OK to leave message on   voicemail  Preferred Call Back Phone Number? [phone]  ---------------------------------------------------------------------------  --------------  SCRIPT ANSWERS  Relationship to Patient? Self  Have you recently (14 days) seen a provider for this problem? No  Have you been diagnosed with, awaiting test results for, or told that you   are suspected of having COVID-19 (Coronavirus)? (If patient has tested   negative or was tested as a requirement for work, school, or travel and   not based on symptoms, answer no)? No  Within the past two weeks have you developed any of the following symptoms   (answer no if symptoms have been present longer than 2 weeks or began   more than 2 weeks ago)? Fever or Chills, Cough, Shortness of breath or   difficulty breathing, Loss of taste or smell, Sore throat, Nasal   congestion, Sneezing or runny nose, Fatigue or generalized body aches   (answer no if pain is specific to a body part e.g. back pain), Diarrhea,   Headache?  Yes

## 2022-02-08 ENCOUNTER — TELEPHONE (OUTPATIENT)
Dept: INTERNAL MEDICINE CLINIC | Age: 57
End: 2022-02-08

## 2022-02-08 NOTE — TELEPHONE ENCOUNTER
I am not familiar with this. Can she call poison control and let me know. I can order labs if needed.

## 2022-02-08 NOTE — TELEPHONE ENCOUNTER
----- Message from Yissel Naldo sent at 2/8/2022 12:09 PM EST -----  Subject: Message to Provider    QUESTIONS  Information for Provider? patient is requesting to have a call back in   regards to a medical; patient states that she dropped a mercury   thermometer and was recommended by [place] emergency management dept   that she should to contact pcp for best plan of care just in case if fumes   were ingested. ---------------------------------------------------------------------------  --------------  G[employer] INFO  What is the best way for the office to contact you? Do not leave any   message, patient will call back for answer  Preferred Call Back Phone Number? [phone]  ---------------------------------------------------------------------------  --------------  SCRIPT ANSWERS  Relationship to Patient?  Self

## 2022-02-08 NOTE — TELEPHONE ENCOUNTER
----- Message from Jamaica Stinson sent at 2/8/2022 12:09 PM EST -----  Subject: Message to Provider    QUESTIONS  Information for Provider? patient is requesting to have a call back in   regards to a medical; patient states that she dropped a mercury   thermometer and was recommended by [place] emergency management dept   that she should to contact pcp for best plan of care just in case if fumes   were ingested. ---------------------------------------------------------------------------  --------------  Hannah OJEDA  What is the best way for the office to contact you? Do not leave any   message, patient will call back for answer  Preferred Call Back Phone Number? [phone]  ---------------------------------------------------------------------------  --------------  SCRIPT ANSWERS  Relationship to Patient?  Self

## 2022-02-09 NOTE — TELEPHONE ENCOUNTER
I called pt per pcp to notify of the below message. Pt verbalized understanding and was thankful. She will call poison control. She is seeing pts right now but will call.

## 2022-07-28 ENCOUNTER — OFFICE VISIT (OUTPATIENT)
Dept: INTERNAL MEDICINE CLINIC | Age: 57
End: 2022-07-28
Payer: COMMERCIAL

## 2022-07-28 ENCOUNTER — TELEPHONE (OUTPATIENT)
Dept: INTERNAL MEDICINE CLINIC | Age: 57
End: 2022-07-28

## 2022-07-28 VITALS
DIASTOLIC BLOOD PRESSURE: 85 MMHG | HEIGHT: 68 IN | OXYGEN SATURATION: 97 % | RESPIRATION RATE: 14 BRPM | WEIGHT: 140 LBS | TEMPERATURE: 97.7 F | SYSTOLIC BLOOD PRESSURE: 136 MMHG | BODY MASS INDEX: 21.22 KG/M2 | HEART RATE: 71 BPM

## 2022-07-28 DIAGNOSIS — H92.02 OTALGIA OF LEFT EAR: ICD-10-CM

## 2022-07-28 DIAGNOSIS — R53.83 FATIGUE, UNSPECIFIED TYPE: ICD-10-CM

## 2022-07-28 DIAGNOSIS — N39.0 RECURRENT UTI: ICD-10-CM

## 2022-07-28 DIAGNOSIS — R11.0 NAUSEA: ICD-10-CM

## 2022-07-28 DIAGNOSIS — B34.9 VIRAL SYNDROME: Primary | ICD-10-CM

## 2022-07-28 LAB
BILIRUB UR QL STRIP: NEGATIVE
GLUCOSE UR-MCNC: NEGATIVE MG/DL
KETONES P FAST UR STRIP-MCNC: NEGATIVE MG/DL
PH UR STRIP: 7 [PH] (ref 4.6–8)
PROT UR QL STRIP: NEGATIVE
SP GR UR STRIP: 1.01 (ref 1–1.03)
UA UROBILINOGEN AMB POC: NORMAL (ref 0.2–1)
URINALYSIS CLARITY POC: CLEAR
URINALYSIS COLOR POC: YELLOW
URINE BLOOD POC: NORMAL
URINE LEUKOCYTES POC: NEGATIVE
URINE NITRITES POC: NEGATIVE

## 2022-07-28 PROCEDURE — 99213 OFFICE O/P EST LOW 20 MIN: CPT | Performed by: INTERNAL MEDICINE

## 2022-07-28 PROCEDURE — 81001 URINALYSIS AUTO W/SCOPE: CPT | Performed by: INTERNAL MEDICINE

## 2022-07-28 NOTE — TELEPHONE ENCOUNTER
I spoke with patient she has weakness x few days, nausea and left ear pain. 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 2 covid test negative. Pt is off today and wants to be seen today. Same day appt scheduled.

## 2022-07-28 NOTE — PROGRESS NOTES
HISTORY OF PRESENT ILLNESS    Chief Complaint   Patient presents with    Flu Like Symptoms     Weakness, nausea, negative covid test     She is a physical therapist.      Reports mild vertigo for the past month. Improved with self-PT. Left ear ache intermittently for weeks. [place]. No swimming in the water. No drainage. No flying. No hearing loss. No hx ceruminosis. Presents today with mild weakness, nausea, chills for 3 days. No recorded fever. Chills are resolved as of yesterday. Took covid test x2 (this AM, yesterday) which were negative. No sick contacts, but did go out to dinner and was with family at the beach. Today, feels overall tired, mild nausea w ear pressure and fuzzy mentally (mild)  Also w some urinary frequency and burning. No cough, congestion. No sore throat  Blood pressure 136/85, pulse 71, temperature 97.7 °F (36.5 °C), temperature source Oral, resp. rate 14, height 5' 8\" (1.727 m), weight 140 lb (63.5 kg), last menstrual period 06/19/2022, SpO2 97 %. No known tick bites, rashes. She mentions that she has some mild dysuria since the symptoms started. She has a history of recurrent UTI. No flank pain. No hematuria. Review of Systems   All other systems reviewed and are negative, except as noted in HPI    Past Medical and Surgical History   has a past medical history of Cardiomyopathy ([address] H/O endoscopy (2020), Hemorrhoid, Hiatal hernia, and History of breast cancer (2018). She has no past medical history of Arthritis, Asthma, Community acquired pneumonia, Diabetes ([address] Hypertension, or Stroke ([address] has a past surgical history that includes hx breast lumpectomy (Left, 2018) and hx colonoscopy (2017). reports that she has never smoked. She has never used smokeless tobacco. She reports that she does not drink alcohol and does not use drugs.   family history includes Cancer in her maternal grandmother and paternal grandmother; High Cholesterol in her father and mother; Hypertension in her father and mother; OSTEOARTHRITIS in her mother; Prostate Cancer in her father; Stroke in her maternal grandfather. Physical Exam   Nursing note and vitals reviewed. Blood pressure 136/85, pulse 71, temperature 97.7 °F (36.5 °C), temperature source Oral, resp. rate 14, height 5' 8\" (1.727 m), weight 140 lb (63.5 kg), last menstrual period 06/19/2022, SpO2 97 %. Constitutional: In no distress. Eyes: Conjunctivae are normal.  HEENT:  No LAD or thyromegaly   Cardiovascular: Normal rate. regular rhythm. No murmurs  No edema  Pulmonary/Chest: Effort normal. clear to ausculation blaterally  Musculoskeletal:  no edema. Abd:  Neurological: Alert and oriented. Grossly intact cranial nerves and motor function. Skin: No visible rash noted. Psychiatric: Normal mood and affect. Behavior is normal.     ASSESSMENT and PLAN  1. Viral syndrome  2. Otalgia of left ear  3. Nausea  Mild symptoms for 3 days. Likely a viral syndrome. Exam is completely benign. COVID-negative x2. Urinalysis normal.  Discussed how we could consider additional work-up including labs like CBC and metabolic panel, but I think the yield will be low at this point. She will go home, rest.  Consider further work-up if symptoms fail to improve. Alarm symptoms discussed. 4. Fatigue, unspecified type  -     AMB POC URINALYSIS DIP STICK AUTO W/ MICRO  5. Recurrent UTI  -     AMB POC URINALYSIS DIP STICK AUTO W/ MICRO      lab results and schedule of future lab studies reviewed with patient  reviewed medications and side effects in detail    Return to clinic for further evaluation if new symptoms develop or if current symptoms worsen or fail to resolve. There are no Patient Instructions on file for this visit.

## 2022-10-20 ENCOUNTER — OFFICE VISIT (OUTPATIENT)
Dept: INTERNAL MEDICINE CLINIC | Age: 57
End: 2022-10-20
Payer: COMMERCIAL

## 2022-10-20 VITALS
RESPIRATION RATE: 14 BRPM | HEART RATE: 72 BPM | WEIGHT: 138 LBS | SYSTOLIC BLOOD PRESSURE: 120 MMHG | TEMPERATURE: 97.8 F | OXYGEN SATURATION: 98 % | HEIGHT: 68 IN | DIASTOLIC BLOOD PRESSURE: 82 MMHG | BODY MASS INDEX: 20.92 KG/M2

## 2022-10-20 DIAGNOSIS — R35.0 FREQUENCY OF MICTURITION: ICD-10-CM

## 2022-10-20 DIAGNOSIS — Z00.00 WELL ADULT EXAM: Primary | ICD-10-CM

## 2022-10-20 DIAGNOSIS — R00.2 PALPITATIONS: ICD-10-CM

## 2022-10-20 DIAGNOSIS — F41.9 ANXIETY: ICD-10-CM

## 2022-10-20 LAB
APPEARANCE UR: CLEAR
BILIRUB UR QL: NEGATIVE
COLOR UR: NORMAL
GLUCOSE UR STRIP.AUTO-MCNC: NEGATIVE MG/DL
HGB UR QL STRIP: NEGATIVE
KETONES UR QL STRIP.AUTO: NEGATIVE MG/DL
LEUKOCYTE ESTERASE UR QL STRIP.AUTO: NEGATIVE
NITRITE UR QL STRIP.AUTO: NEGATIVE
PH UR STRIP: 7 [PH] (ref 5–8)
PROT UR STRIP-MCNC: NEGATIVE MG/DL
SP GR UR REFRACTOMETRY: 1.01 (ref 1–1.03)
UROBILINOGEN UR QL STRIP.AUTO: 0.2 EU/DL (ref 0.2–1)

## 2022-10-20 PROCEDURE — 99213 OFFICE O/P EST LOW 20 MIN: CPT | Performed by: INTERNAL MEDICINE

## 2022-10-20 PROCEDURE — 99396 PREV VISIT EST AGE 40-64: CPT | Performed by: INTERNAL MEDICINE

## 2022-10-20 RX ORDER — NITROFURANTOIN 25; 75 MG/1; MG/1
100 CAPSULE ORAL 2 TIMES DAILY
Qty: 10 CAPSULE | Refills: 0 | Status: SHIPPED | OUTPATIENT
Start: 2022-10-20

## 2022-10-20 NOTE — PROGRESS NOTES
Brandy Barry is a 56 y.o. female and presents with Complete Physical (Not fasting)  . Subjective:    Patient is here for her annual.  She reports overall good health but she does have some concerns. Left breast cancer  followed by Dr. Michelle Au. She notes she will complete her 5 years of tamoxifen in June  She notes she has been having an increase amount of UTIs since being on the tamoxifen      Uti   Followed by Anita Essex for uti and stress inconteince  Kathyrn oxford, Pelvic PT  Patient was offered sling for increased frequency    Hospitalized [facility]  Spring/summer 2022. She notes feeling some hot and cold sensation. Heart racing, pelvic abdominal pain, increased frequency  Urgent care to ER  Echo, ekg were within normal limits. Of note she responded very quickly to Rocephin      Palpitations  Follwed up with Dr. Kristina Hernandez. 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 She was actually on the monitor from [address] normal   [facility] had monitor and also noted to be normal however patient was not having palpitations at the time      Periumbilical hernia  Did not have evaluated   If avoids carbs dos nto bother her        Rectal bleed  2020/  colonscopy normal  hemorroids  Changed diet and no further bleeding    Gyn exam  DR. Willa Badillo following        . Anxiety  Pt was seen by dr. Patricia Hernandez. On refmeron low dose for sleep  and aniety. She notes that he encouraged her to go back on her BuSpar as her anxiety symptoms are better controlled  Not on bupar currently        Review of Systems  Constitutional: negative for fevers, chills, anorexia and weight loss  Eyes:   negative for visual disturbance and irritation  ENT:   negative for tinnitus,sore throat,nasal congestion,ear pains. hoarseness  Respiratory:  negative for cough, hemoptysis, dyspnea,wheezing  CV:   negative for chest pain, palpitations, lower extremity edema  GI:   negative for nausea, vomiting, diarrhea, abdominal pain,melena  Endo:               negative for polyuria,polydipsia,polyphagia,heat intolerance  Genitourinary: negative for frequency, dysuria and hematuria  Integument:  negative for rash and pruritus  Hematologic:  negative for easy bruising and gum/nose bleeding  Musculoskel: negative for myalgias, arthralgias, back pain, muscle weakness, joint pain  Neurological:  negative for headaches, dizziness, vertigo, memory problems and gait   Behavl/Psych: negative for feelings of anxiety, depression, mood changes    Past Medical History:   Diagnosis Date    Cardiomy[address] [address] [facility]     post partum cardiomyothy    H/O endoscopy 2020    Hemorrhoid     Hiatal hernia     History of breast cancer 2018    left breast, lumpectomy, radtiation no chemotherapy stage 1 , dr. El Baez     Past Surgical History:   Procedure Laterality Date    HX BREAST LUMPECTOMY Left 2018    HX COLONOSCOPY  2017    endoscopy with dr. 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 tamoxifen (NOLVADEX) 20 mg tablet       cholecalciferol, vitamin D3, 50 mcg (2,000 unit) tab Take  by mouth.       omega 3-dha-epa-fish oil (Fish Oil) 100-160-1,000 mg cap Take  by mouth.      b complex-vitamin c-folic acid (DIALYVITE) 100-1 mg tab tablet Take 1 Tab by mouth daily. No Known Allergies    Objective:  Visit Vitals  /82 (BP 1 Location: Left upper arm, BP Patient Position: Sitting, BP Cuff Size: Small adult)   Pulse 72   Temp 97.8 °F (36.6 °C) (Oral)   Resp 14   Ht 5' 8\" (1.727 m)   Wt 138 lb (62.6 kg)   LMP 10/10/2022 (Approximate)   SpO2 98%   BMI 20.98 kg/m²     Physical Exam:   General appearance - alert, well appearing, and in no distress  Mental status - alert, oriented to person, place, and time  EYE-MARTI, EOMI, corneas normal, no foreign bodies, visual acuity normal both eyes, no periorbital cellulitis  ENT-ENT exam normal, no neck nodes or sinus tenderness  Nose - normal and patent, no erythema, discharge or polyps  Mouth - mucous membranes moist, pharynx normal without lesions  Neck - supple, no significant adenopathy   Chest - clear to auscultation, no wheezes, rales or rhonchi, symmetric air entry   Heart - normal rate, regular rhythm, normal S1, S2, no murmurs, rubs, clicks or gallops   Abdomen - soft, nontender, nondistended, no masses or organomegaly  Lymph- no adenopathy palpable  Ext-peripheral pulses normal, no pedal edema, no clubbing or cyanosis  Skin-Warm and dry.  no hyperpigmentation, vitiligo, or suspicious lesions  Neuro -alert, oriented, normal speech, no focal findings or movement disorder noted  Neck-normal C-spine, no tenderness, full ROM without pain      Results for orders placed or performed in visit on 07/28/22   AMB POC URINALYSIS DIP STICK AUTO W/ MICRO   Result Value Ref Range    Color (UA POC) Yellow     Clarity (UA POC) Clear     Glucose (UA POC) Negative Negative    Bilirubin (UA POC) Negative Negative    Ketones (UA POC) Negative Negative    Specific gravity (UA POC) 1.015 1.001 - 1.035    Blood (UA POC) Trace Negative    pH (UA POC) 7.0 4.6 - 8.0    Protein (UA POC) Negative Negative Urobilinogen (UA POC) 0.2 mg/dL 0.2 - 1    Nitrites (UA POC) Negative Negative    Leukocyte esterase (UA POC) Negative Negative     Prevention    Cardiovascular profile  History of radiation to left heart  Echo, EKg done by dr. Trisha montenegro  Exercising:    Blood pressure:  Health healthy diet:  Diabetes:  Cholesterol:  Renal function:      Cancer risk profile  Cardio and weight lifting  Mammogram dr. Marianne Varma  Lung  Colonoscopy  Skin nonhealing in 2 weeks edsall every 6 months  Gyn abnormal bleeding/discharge/abd pain/pressure      Thyroid sx  Patient has history of anxiety we will check TSH    Osteopenia prevention  Calcium 1000mg/day yes  Vitamin D 800iu/day yes    Mental health scale: 8-9/10  Depression  Anxiety  Sleep # of hours:  Energy Level:        Immunizations  She is due for her Tdap  TDAP  Pneumonia vaccine  Flu vaccine  Shingles vaccine  HPV    Diagnoses and all orders for this visit:    1. Well adult exam  Patient reports overall good physical health although recently dealing with episodes of increased frequency and palpitations over the past year  -     METABOLIC PANEL, COMPREHENSIVE; Future  -     TSH 3RD GENERATION; Future  -     LIPID PANEL; Future  -     CBC W/O DIFF; Future  -     HEMOGLOBIN A1C WITH EAG; Future    2. Frequency of micturition  Discussed with patient and I am curious if her anxiety may be aggravating her frequency as her synthetic system may be activated    She notes having frequency about 10-12 times per day  Will restart her BuSpar and see if this will decrease her frequency which may offset the need for a sling  -     URINALYSIS W/ RFLX MICROSCOPIC; Future  -     CULTURE, URINE; Future  -     nitrofurantoin, macrocrystal-monohydrate, (MACROBID) 100 mg capsule; Take 1 Capsule by mouth two (2) times a day.     3. Anxiety  Not optimally controlled per psychiatry  Encouraged her to try the BuSpar as low side effect profile and also decreases background noise with regards to symptoms if fed by uncontrolled anxiety    4. Palpitations  Noted palpitations in the ER and normal monitor  Possibly anxiety  I do think she had a UTI in the ER but palpitations may have been stress related  Of interest her monitor was read as normal per patient      This medical record was transcribed using an electronic medical records/speech recognition system. Although proofread, it may and can contain electronic, spelling and other errors. Corrections may be executed at a later time. Please contact us for any clarifications as needed.   Aside from patient's physical examon this date 10/20/22  I have spent 20 minutes reviewing previous notes, test results and face to face with the patient discussing the diagnosis and importance of compliance with the treatment plan as well as documenting on the day of the visit

## 2022-10-21 LAB
ALBUMIN SERPL-MCNC: 3.8 G/DL (ref 3.5–5)
ALBUMIN/GLOB SERPL: 1 {RATIO} (ref 1.1–2.2)
ALP SERPL-CCNC: 71 U/L (ref 45–117)
ALT SERPL-CCNC: 36 U/L (ref 12–78)
ANION GAP SERPL CALC-SCNC: 6 MMOL/L (ref 5–15)
AST SERPL-CCNC: 26 U/L (ref 15–37)
BACTERIA SPEC CULT: ABNORMAL
BILIRUB SERPL-MCNC: 0.4 MG/DL (ref 0.2–1)
BUN SERPL-MCNC: 14 MG/DL (ref 6–20)
BUN/CREAT SERPL: 16 (ref 12–20)
CALCIUM SERPL-MCNC: 9.1 MG/DL (ref 8.5–10.1)
CC UR VC: ABNORMAL
CHLORIDE SERPL-SCNC: 107 MMOL/L (ref 97–108)
CHOLEST SERPL-MCNC: 175 MG/DL
CO2 SERPL-SCNC: 28 MMOL/L (ref 21–32)
CREAT SERPL-MCNC: 0.9 MG/DL (ref 0.55–1.02)
ERYTHROCYTE [DISTWIDTH] IN BLOOD BY AUTOMATED COUNT: 12.4 % (ref 11.5–14.5)
EST. AVERAGE GLUCOSE BLD GHB EST-MCNC: 117 MG/DL
GLOBULIN SER CALC-MCNC: 3.9 G/DL (ref 2–4)
GLUCOSE SERPL-MCNC: 103 MG/DL (ref 65–100)
HBA1C MFR BLD: 5.7 % (ref 4–5.6)
HCT VFR BLD AUTO: 43.3 % (ref 35–47)
HDLC SERPL-MCNC: 73 MG/DL
HDLC SERPL: 2.4 {RATIO} (ref 0–5)
HGB BLD-MCNC: 14.2 G/DL (ref 11.5–16)
LDLC SERPL CALC-MCNC: 88.4 MG/DL (ref 0–100)
MCH RBC QN AUTO: 30.9 PG (ref 26–34)
MCHC RBC AUTO-ENTMCNC: 32.8 G/DL (ref 30–36.5)
MCV RBC AUTO: 94.3 FL (ref 80–99)
NRBC # BLD: 0 K/UL (ref 0–0.01)
NRBC BLD-RTO: 0 PER 100 WBC
PLATELET # BLD AUTO: 250 K/UL (ref 150–400)
PMV BLD AUTO: 10.9 FL (ref 8.9–12.9)
POTASSIUM SERPL-SCNC: 4.7 MMOL/L (ref 3.5–5.1)
PROT SERPL-MCNC: 7.7 G/DL (ref 6.4–8.2)
RBC # BLD AUTO: 4.59 M/UL (ref 3.8–5.2)
SERVICE CMNT-IMP: ABNORMAL
SODIUM SERPL-SCNC: 141 MMOL/L (ref 136–145)
TRIGL SERPL-MCNC: 68 MG/DL (ref ?–150)
TSH SERPL DL<=0.05 MIU/L-ACNC: 0.96 UIU/ML (ref 0.36–3.74)
VLDLC SERPL CALC-MCNC: 13.6 MG/DL
WBC # BLD AUTO: 8.4 K/UL (ref 3.6–11)

## 2022-10-27 ENCOUNTER — TELEPHONE (OUTPATIENT)
Dept: INTERNAL MEDICINE CLINIC | Age: 57
End: 2022-10-27

## 2022-10-27 RX ORDER — AMOXICILLIN 875 MG/1
875 TABLET, FILM COATED ORAL 2 TIMES DAILY
Qty: 10 TABLET | Refills: 0 | Status: SHIPPED | OUTPATIENT
Start: 2022-10-27

## 2022-10-27 NOTE — TELEPHONE ENCOUNTER
Patient is calling to follow up on her UTI results, states she feels her symptoms are worsening, advised her nurse was not in as of yet. Spoke with another nurse who advised when nurse arrives she will call pt back.

## 2022-10-27 NOTE — TELEPHONE ENCOUNTER
I called pt back, she wanted her urine cx results. I read the result letter to pt written by pcp. Pt is having urinary frequency with burning. Pt is also having nausea and just feeling \"tired\". She was given macrobid by pcp. Pt wants to make sure this abx is the correct abx to treat the bacteria that grew in her urine. If not, can we send in the correct abx?

## 2022-10-27 NOTE — TELEPHONE ENCOUNTER
Patient was reaching back out returning nurses call - please call back when possible.  Patient states if she cant get her on the phone she can leave a message

## 2022-12-09 ENCOUNTER — HOSPITAL ENCOUNTER (OUTPATIENT)
Age: 57
Setting detail: OBSERVATION
Discharge: HOME | End: 2022-12-12
Attending: EMERGENCY MEDICINE | Admitting: HOSPITALIST
Payer: COMMERCIAL

## 2022-12-09 DIAGNOSIS — K62.5 RECTAL BLEEDING: Primary | ICD-10-CM

## 2022-12-09 DIAGNOSIS — K92.1 GASTROINTESTINAL HEMORRHAGE WITH MELENA: ICD-10-CM

## 2022-12-09 LAB
ALBUMIN SERPL-MCNC: 3.5 G/DL (ref 3.5–5)
ALBUMIN/GLOB SERPL: 0.9 {RATIO} (ref 1.1–2.2)
ALP SERPL-CCNC: 73 U/L (ref 45–117)
ALT SERPL-CCNC: 38 U/L (ref 12–78)
ANION GAP SERPL CALC-SCNC: 8 MMOL/L (ref 5–15)
AST SERPL-CCNC: 29 U/L (ref 15–37)
BASOPHILS # BLD: 0 K/UL (ref 0–0.1)
BASOPHILS NFR BLD: 0 % (ref 0–1)
BILIRUB SERPL-MCNC: 0.4 MG/DL (ref 0.2–1)
BUN SERPL-MCNC: 13 MG/DL (ref 6–20)
BUN/CREAT SERPL: 13 (ref 12–20)
CALCIUM SERPL-MCNC: 8.6 MG/DL (ref 8.5–10.1)
CHLORIDE SERPL-SCNC: 109 MMOL/L (ref 97–108)
CO2 SERPL-SCNC: 24 MMOL/L (ref 21–32)
COMMENT, HOLDF: NORMAL
CREAT SERPL-MCNC: 0.98 MG/DL (ref 0.55–1.02)
DIFFERENTIAL METHOD BLD: NORMAL
EOSINOPHIL # BLD: 0.1 K/UL (ref 0–0.4)
EOSINOPHIL NFR BLD: 1 % (ref 0–7)
ERYTHROCYTE [DISTWIDTH] IN BLOOD BY AUTOMATED COUNT: 12.8 % (ref 11.5–14.5)
GLOBULIN SER CALC-MCNC: 3.9 G/DL (ref 2–4)
GLUCOSE SERPL-MCNC: 132 MG/DL (ref 65–100)
HCT VFR BLD AUTO: 43 % (ref 35–47)
HEMOCCULT STL QL: NEGATIVE
HGB BLD-MCNC: 14.6 G/DL (ref 11.5–16)
IMM GRANULOCYTES # BLD AUTO: 0 K/UL (ref 0–0.04)
IMM GRANULOCYTES NFR BLD AUTO: 0 % (ref 0–0.5)
LYMPHOCYTES # BLD: 2.7 K/UL (ref 0.8–3.5)
LYMPHOCYTES NFR BLD: 38 % (ref 12–49)
MCH RBC QN AUTO: 30.8 PG (ref 26–34)
MCHC RBC AUTO-ENTMCNC: 34 G/DL (ref 30–36.5)
MCV RBC AUTO: 90.7 FL (ref 80–99)
MONOCYTES # BLD: 0.5 K/UL (ref 0–1)
MONOCYTES NFR BLD: 7 % (ref 5–13)
NEUTS SEG # BLD: 3.7 K/UL (ref 1.8–8)
NEUTS SEG NFR BLD: 54 % (ref 32–75)
NRBC # BLD: 0 K/UL (ref 0–0.01)
NRBC BLD-RTO: 0 PER 100 WBC
PLATELET # BLD AUTO: 271 K/UL (ref 150–400)
PMV BLD AUTO: 9.6 FL (ref 8.9–12.9)
POTASSIUM SERPL-SCNC: 3.7 MMOL/L (ref 3.5–5.1)
PROT SERPL-MCNC: 7.4 G/DL (ref 6.4–8.2)
RBC # BLD AUTO: 4.74 M/UL (ref 3.8–5.2)
SAMPLES BEING HELD,HOLD: NORMAL
SODIUM SERPL-SCNC: 141 MMOL/L (ref 136–145)
WBC # BLD AUTO: 7 K/UL (ref 3.6–11)

## 2022-12-09 PROCEDURE — 85730 THROMBOPLASTIN TIME PARTIAL: CPT

## 2022-12-09 PROCEDURE — 74011250636 HC RX REV CODE- 250/636: Performed by: EMERGENCY MEDICINE

## 2022-12-09 PROCEDURE — 83735 ASSAY OF MAGNESIUM: CPT

## 2022-12-09 PROCEDURE — 84100 ASSAY OF PHOSPHORUS: CPT

## 2022-12-09 PROCEDURE — 36415 COLL VENOUS BLD VENIPUNCTURE: CPT

## 2022-12-09 PROCEDURE — 82272 OCCULT BLD FECES 1-3 TESTS: CPT

## 2022-12-09 PROCEDURE — 85025 COMPLETE CBC W/AUTO DIFF WBC: CPT

## 2022-12-09 PROCEDURE — 83690 ASSAY OF LIPASE: CPT

## 2022-12-09 PROCEDURE — 99285 EMERGENCY DEPT VISIT HI MDM: CPT

## 2022-12-09 PROCEDURE — 85610 PROTHROMBIN TIME: CPT

## 2022-12-09 PROCEDURE — 80053 COMPREHEN METABOLIC PANEL: CPT

## 2022-12-09 RX ADMIN — SODIUM CHLORIDE 1000 ML: 9 INJECTION, SOLUTION INTRAVENOUS at 23:28

## 2022-12-10 ENCOUNTER — APPOINTMENT (OUTPATIENT)
Dept: CT IMAGING | Age: 57
End: 2022-12-10
Attending: EMERGENCY MEDICINE
Payer: COMMERCIAL

## 2022-12-10 PROBLEM — K92.2 GI BLEED: Status: ACTIVE | Noted: 2022-12-10

## 2022-12-10 LAB
ANION GAP SERPL CALC-SCNC: 3 MMOL/L (ref 5–15)
APPEARANCE UR: CLEAR
APTT PPP: 24.5 SEC (ref 22.1–31)
BACTERIA URNS QL MICRO: NEGATIVE /HPF
BILIRUB UR QL: NEGATIVE
BUN SERPL-MCNC: 10 MG/DL (ref 6–20)
BUN/CREAT SERPL: 11 (ref 12–20)
CALCIUM SERPL-MCNC: 8.7 MG/DL (ref 8.5–10.1)
CHLORIDE SERPL-SCNC: 110 MMOL/L (ref 97–108)
CO2 SERPL-SCNC: 27 MMOL/L (ref 21–32)
COLOR UR: ABNORMAL
COMMENT, HOLDF: NORMAL
CREAT SERPL-MCNC: 0.91 MG/DL (ref 0.55–1.02)
EPITH CASTS URNS QL MICRO: ABNORMAL /LPF
GLUCOSE SERPL-MCNC: 99 MG/DL (ref 65–100)
GLUCOSE UR STRIP.AUTO-MCNC: NEGATIVE MG/DL
HGB BLD-MCNC: 13.6 G/DL (ref 11.5–16)
HGB UR QL STRIP: NEGATIVE
HYALINE CASTS URNS QL MICRO: ABNORMAL /LPF (ref 0–2)
INR PPP: 1 (ref 0.9–1.1)
KETONES UR QL STRIP.AUTO: ABNORMAL MG/DL
LEUKOCYTE ESTERASE UR QL STRIP.AUTO: ABNORMAL
LIPASE SERPL-CCNC: 260 U/L (ref 73–393)
MAGNESIUM SERPL-MCNC: 2.2 MG/DL (ref 1.6–2.4)
NITRITE UR QL STRIP.AUTO: NEGATIVE
PH UR STRIP: 8 [PH] (ref 5–8)
PHOSPHATE SERPL-MCNC: 2.6 MG/DL (ref 2.6–4.7)
POTASSIUM SERPL-SCNC: 3.8 MMOL/L (ref 3.5–5.1)
PROT UR STRIP-MCNC: NEGATIVE MG/DL
PROTHROMBIN TIME: 10.3 SEC (ref 9–11.1)
RBC #/AREA URNS HPF: ABNORMAL /HPF (ref 0–5)
SAMPLES BEING HELD,HOLD: NORMAL
SODIUM SERPL-SCNC: 140 MMOL/L (ref 136–145)
SP GR UR REFRACTOMETRY: 1.01 (ref 1–1.03)
THERAPEUTIC RANGE,PTTT: NORMAL SECS (ref 58–77)
UR CULT HOLD, URHOLD: NORMAL
UROBILINOGEN UR QL STRIP.AUTO: 0.2 EU/DL (ref 0.2–1)
WBC URNS QL MICRO: ABNORMAL /HPF (ref 0–4)

## 2022-12-10 PROCEDURE — G0378 HOSPITAL OBSERVATION PER HR: HCPCS

## 2022-12-10 PROCEDURE — 74011000250 HC RX REV CODE- 250: Performed by: HOSPITALIST

## 2022-12-10 PROCEDURE — C9113 INJ PANTOPRAZOLE SODIUM, VIA: HCPCS | Performed by: HOSPITALIST

## 2022-12-10 PROCEDURE — 74011000636 HC RX REV CODE- 636: Performed by: RADIOLOGY

## 2022-12-10 PROCEDURE — 96374 THER/PROPH/DIAG INJ IV PUSH: CPT

## 2022-12-10 PROCEDURE — 74011250636 HC RX REV CODE- 250/636: Performed by: HOSPITALIST

## 2022-12-10 PROCEDURE — 80048 BASIC METABOLIC PNL TOTAL CA: CPT

## 2022-12-10 PROCEDURE — 85018 HEMOGLOBIN: CPT

## 2022-12-10 PROCEDURE — 74178 CT ABD&PLV WO CNTR FLWD CNTR: CPT

## 2022-12-10 PROCEDURE — 36415 COLL VENOUS BLD VENIPUNCTURE: CPT

## 2022-12-10 PROCEDURE — 81001 URINALYSIS AUTO W/SCOPE: CPT

## 2022-12-10 PROCEDURE — 74011250637 HC RX REV CODE- 250/637: Performed by: HOSPITALIST

## 2022-12-10 RX ORDER — SODIUM CHLORIDE 0.9 % (FLUSH) 0.9 %
5-40 SYRINGE (ML) INJECTION AS NEEDED
Status: DISCONTINUED | OUTPATIENT
Start: 2022-12-10 | End: 2022-12-12 | Stop reason: HOSPADM

## 2022-12-10 RX ORDER — ASCORBIC ACID 500 MG
500 TABLET ORAL DAILY
COMMUNITY

## 2022-12-10 RX ORDER — SODIUM CHLORIDE 9 MG/ML
100 INJECTION, SOLUTION INTRAVENOUS CONTINUOUS
Status: DISCONTINUED | OUTPATIENT
Start: 2022-12-10 | End: 2022-12-11

## 2022-12-10 RX ORDER — SODIUM CHLORIDE 0.9 % (FLUSH) 0.9 %
5-40 SYRINGE (ML) INJECTION EVERY 8 HOURS
Status: DISCONTINUED | OUTPATIENT
Start: 2022-12-10 | End: 2022-12-12 | Stop reason: HOSPADM

## 2022-12-10 RX ORDER — MIRTAZAPINE 15 MG/1
7.5 TABLET, FILM COATED ORAL
Status: DISCONTINUED | OUTPATIENT
Start: 2022-12-10 | End: 2022-12-12 | Stop reason: HOSPADM

## 2022-12-10 RX ORDER — TAMOXIFEN CITRATE 10 MG/1
20 TABLET, FILM COATED ORAL EVERY EVENING
Status: DISCONTINUED | OUTPATIENT
Start: 2022-12-10 | End: 2022-12-12 | Stop reason: HOSPADM

## 2022-12-10 RX ADMIN — SODIUM CHLORIDE 100 ML/HR: 9 INJECTION, SOLUTION INTRAVENOUS at 15:29

## 2022-12-10 RX ADMIN — SODIUM CHLORIDE 40 MG: 9 INJECTION, SOLUTION INTRAMUSCULAR; INTRAVENOUS; SUBCUTANEOUS at 04:59

## 2022-12-10 RX ADMIN — SODIUM CHLORIDE, PRESERVATIVE FREE 10 ML: 5 INJECTION INTRAVENOUS at 21:04

## 2022-12-10 RX ADMIN — IOPAMIDOL 100 ML: 755 INJECTION, SOLUTION INTRAVENOUS at 00:53

## 2022-12-10 RX ADMIN — MIRTAZAPINE 7.5 MG: 15 TABLET, FILM COATED ORAL at 21:04

## 2022-12-10 RX ADMIN — SODIUM CHLORIDE 100 ML/HR: 9 INJECTION, SOLUTION INTRAVENOUS at 05:01

## 2022-12-10 RX ADMIN — TAMOXIFEN CITRATE 20 MG: 10 TABLET ORAL at 21:04

## 2022-12-10 RX ADMIN — SODIUM CHLORIDE, PRESERVATIVE FREE 10 ML: 5 INJECTION INTRAVENOUS at 05:00

## 2022-12-10 NOTE — ED PROVIDER NOTES
72-year-old female with a past medical history significant for postpartum cardiomyopathy, breast cancer s/p lumpectomy, radiation, hemorrhoid, hiatal hernia who presents to the ER accompanied by  at the bedside with rectal bleeding this evening. The patient said that when she went to the bathroom she had blood streaked in the stool followed by bloody stool 2-3 times later. The patient also state that her urine seems concentrated dark. She denies any fever and chills, sore throat, cough or congestion, headache, neck and back pain, chest pain shortness of breath, diarrhea, constipation, vaginal discharge or bleeding, extremity weakness or numbness, sick contact, skin rash or recent travel.        Past Medical History:   Diagnosis Date    Cardiomyopathy ([facility] [address]     post partum cardiomyothy    H/O endoscopy 2020    Hemorrhoid     Hiatal hernia     History of breast cancer 2018    left breast, lumpectomy, radtiation no chemotherapy stage 1 , dr. Kelechi Moreno       Past Surgical History:   Procedure Laterality Date    HX BREAST LUMPECTOMY Left 2018    HX COLONOSCOPY  2017    endoscopy with dr. Belle Rodrigues         Family History:   Problem Relation Age of Onset    Hypertension Mother     High Cholesterol Mother     OSTEOARTHRITIS Mother     Hypertension Father     High Cholesterol Father     Prostate Cancer Father     Cancer Maternal Grandmother         lung    Cancer Paternal Grandmother         pancreatic    Stroke Maternal Grandfather        Social History     Socioeconomic History    Marital status:      Spouse name: Not on file    Number of children: Not on file    Years of education: Not on file    Highest education level: Not on file   Occupational History    Not on file   Tobacco Use    Smoking status: Never    Smokeless tobacco: Never   Substance and Sexual Activity    Alcohol use: No    Drug use: No    Sexual activity: Yes     Partners: Male   Other Topics Concern    Not on file   Social History Narrative Full time, [employer]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 Vitals:    12/09/22 2222   BP: 114/67   Pulse: 76   Resp: 17   Temp: 97.5 °F (36.4 °C)   SpO2: 99%   Weight: 63.4 kg (139 lb 12.4 oz)            Physical Exam  Vitals and nursing note reviewed. Exam conducted with a chaperone present. CONSTITUTIONAL: Well-appearing; well-nourished; in no apparent distress  HEAD: Normocephalic; atraumatic  EYES: PERRL; EOM intact; conjunctiva and sclera are clear bilaterally. ENT: No rhinorrhea; normal pharynx with no tonsillar hypertrophy; mucous membranes pink/moist, no erythema, no exudate. NECK: Supple; non-tender; no cervical lymphadenopathy  CARD: Normal S1, S2; no murmurs, rubs, or gallops. Regular rate and rhythm. RESP: Normal respiratory effort; breath sounds clear and equal bilaterally; no wheezes, rhonchi, or rales. ABD: Normal bowel sounds; non-distended; non-tender; no palpable organomegaly, no masses, no bruits. Back Exam: Normal inspection; no vertebral point tenderness, no CVA tenderness. Normal range of motion. EXT: Normal ROM in all four extremities; non-tender to palpation; no swelling or deformity; distal pulses are normal, no edema. SKIN: Warm; dry; no rash.   NEURO:Alert and oriented x 3, coherent, PACO-XII grossly intact, sensory and motor are non-focal.  Rectal exam: Nonbleeding external hemorrhoid; good tone; bright red blood per rectum        MDM  Number of Diagnoses or Management Options  Gastrointestinal hemorrhage with melena  Rectal bleeding  Diagnosis management comments: Assessment: 77-year-old female who presents to the ER for evaluation for GI bleed with rectal bleeding and bright red blood per rectum on exam.  She remained hemodynamically stable. Differential diagnosis includes internal hemorrhoid versus AVM versus diverticulosis and peptic ulcer disease. Plan: Lab/IV fluid/antiemetic and analgesia/CT scan of the abdomen and pelvis/serial exam/ Monitor and Reevaluate. Amount and/or Complexity of Data Reviewed  Clinical lab tests: ordered and reviewed  Tests in the radiology section of CPT®: ordered and reviewed  Tests in the medicine section of CPT®: reviewed and ordered  Decide to obtain previous medical records or to obtain history from someone other than the patient: yes  Obtain history from someone other than the patient: yes  Review and summarize past medical records: yes  Discuss the patient with other providers: yes  Independent visualization of images, tracings, or specimens: yes    Risk of Complications, Morbidity, and/or Mortality  Presenting problems: moderate  Diagnostic procedures: moderate  Management options: moderate    Patient Progress  Patient progress: stable         Procedures    PROGRESS NOTE:  Pt has been reexamined by Allie Miller MD all available results have been reviewed with pt and any available family. Pt understands sx, dx, and tx in ED. Care plan has been outlined and questions have been answered. Pt and any available family understands and agrees to need for admission to hospital for further tx not available in ED. Pt is ready for admission. Written by Renee Sanderson MD,  2:18 AM    CONSULT NOTE:  Allie Miller MD spoke with Dr. Tru Rouse of the hospitalist team.  Discussed patient's presentation, history, physical assessment, and available diagnostic results. Will come and see the patient in the ED.      Perfect Serve Consult for Admission  2:19 AM    ED Room Number: ERG/G  Patient Name and age:  Royal Dec 56 y.o.  female  Working Diagnosis:   1. Rectal bleeding    2. Gastrointestinal hemorrhage with melena        COVID-19 Suspicion:  no  Sepsis present:  no  Reassessment needed: no  Code Status:  Full Code  Readmission: no  Isolation Requirements:  no  Recommended Level of Care:  telemetry  Department: [facility] ED - [phone]  Admitting Provider: Dr. Minal Haro    Other: The patient had bright red blood from below on rectal exam and remained hemodynamically stable. Total critical care time spent exclusive of procedures:  55 minutes.

## 2022-12-10 NOTE — PROGRESS NOTES
Patient seen and examined. Admitted earlier this morning for rectal bleed  Hb is stable  DD: AVM, hemorrhoids    A/P  Continue gentle IVF hydration.  Await GI eval  Possible colonoscopy in the hospital or on an outpatient basis

## 2022-12-10 NOTE — ED NOTES
TRANSFER - OUT REPORT:    Verbal report given to ***(name) on Allison Born  being transferred to Med-Surg(unit) for routine progression of care       Report consisted of patients Situation, Background, Assessment and   Recommendations(SBAR). Information from the following report(s) SBAR, ED Summary, and Recent Results was reviewed with the receiving nurse. Lines:   Peripheral IV 12/09/22 Right Forearm (Active)        Opportunity for questions and clarification was provided.       Patient transported with:   Registered Nurse

## 2022-12-10 NOTE — PROGRESS NOTES
Gastrointestinal Consult    Subjective:     Patient is a 56 y.o.  female who is being seen with bloody stools. Onset of symptoms was abrupt with unchanged course since that time. Patient also notes absence of pain, fever, nausea, vomiting. Symptoms improve with none. Symptoms worsen with none. Since admission she states she has had several bowel movements with pink to red blood mixed with the bowel movement.     She has had colonoscopy examinations with Dr. Nikki Carrel    Patient Active Problem List    Diagnosis Date Noted    GI bleed 12/10/2022     Past Medical History:   Diagnosis Date    Cardiomyopathy [facility])     post partum cardiomyothy    H/O endoscopy 2020    Hemorrhoid     Hiatal hernia     History of breast cancer 2018    left breast, lumpectomy, radtiation no chemotherapy stage 1 , dr. Macey Martinez      Past Surgical History:   Procedure Laterality Date    HX BREAST LUMPECTOMY Left 2018    HX COLONOSCOPY  2017    endoscopy with dr. Augustine Valadez     Family History   Problem Relation Age of Onset    Hypertension Mother     High Cholesterol Mother     OSTEOARTHRITIS Mother     Hypertension Father     High Cholesterol Father     Prostate Cancer Father     Cancer Maternal Grandmother         lung    Cancer Paternal Grandmother         pancreatic    Stroke Maternal Grandfather       Social History     Socioeconomic History    Marital status:    Tobacco Use    Smoking status: Never    Smokeless tobacco: Never   Substance and Sexual Activity    Alcohol use: No    Drug use: No    Sexual activity: Yes     Partners: Male   Social History Narrative    Full time, [employer] now [facility]    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            28 yo, 22 yo, 17 yo     Kids and  healthy       Current Facility-Administered Medications   Medication Dose Route Frequency Provider Last Rate Last Admin    sodium chloride (NS) flush 5-40 mL  5-40 mL IntraVENous Q8H Cristina Price MD   10 mL at 12/10/22 0500 sodium chloride (NS) flush 5-40 mL  5-40 mL IntraVENous PRN Ruby Barr MD        0.9% sodium chloride infusion  100 mL/hr IntraVENous CONTINUOUS Cristina Price  mL/hr at 12/10/22 0501 100 mL/hr at 12/10/22 0501         Allergies   Allergen Reactions    Honeydew Anaphylaxis    Lidocaine-Epinephrine Palpitations        Review of Systems:  Constitutional: negative for fevers, chills, sweats, and weight loss  Eyes: negative for visual disturbance and icterus  Respiratory: negative for cough, sputum, hemoptysis, wheezing, dyspnea on exertion, or stridor  Cardiovascular: negative for chest pain, chest pressure/discomfort, palpitations, lower extremity edema  Gastrointestinal: negative for dysphagia, odynophagia, nausea, vomiting, melena, abdominal pain, and jaundice  Musculoskeletal:negative for bone pain     Objective:     Patient Vitals for the past 8 hrs:   BP Temp Pulse Resp SpO2 Height Weight   12/10/22 0800 116/60 97.8 °F (36.6 °C) 64 16 98 % -- --   12/10/22 0740 -- -- 64 -- -- -- --   12/10/22 0455 -- -- 68 -- -- -- --   12/10/22 0429 118/74 98.2 °F (36.8 °C) 65 16 98 % 5' 8\" (1.727 m) 63.4 kg (139 lb 12.4 oz)       Physical Exam:   Visit Vitals  /60 (BP 1 Location: Right upper arm)   Pulse 64   Temp 97.8 °F (36.6 °C)   Resp 16   Ht 5' 8\" (1.727 m)   Wt 63.4 kg (139 lb 12.4 oz)   SpO2 98%   Breastfeeding No   BMI 21.25 kg/m²     General appearance: alert, cooperative, no distress, appears stated age  Head: Normocephalic, without obvious abnormality, atraumatic  Lungs: clear to auscultation bilaterally  Heart: regular rate and rhythm  Abdomen: soft, non-tender. Bowel sounds normal. No masses,  no organomegaly  Extremities: extremities normal, atraumatic, no cyanosis or edema  Skin: Skin color, texture, turgor normal. No rashes or lesions  Rectal: Medium brown bowel movement with thin coating of blood. This is a chaperoned examination with nursing staff present.     Lab/Data Review:  Recent Results (from the past 24 hour(s))   CBC WITH AUTOMATED DIFF    Collection Time: 12/09/22 10:32 PM   Result Value Ref Range    WBC 7.0 3.6 - 11.0 K/uL    RBC 4.74 3.80 - 5.20 M/uL    HGB 14.6 11.5 - 16.0 g/dL    HCT 43.0 35.0 - 47.0 %    MCV 90.7 80.0 - 99.0 FL    MCH 30.8 26.0 - 34.0 PG    MCHC 34.0 30.0 - 36.5 g/dL    RDW 12.8 11.5 - 14.5 %    PLATELET 962 335 - 898 K/uL    MPV 9.6 8.9 - 12.9 FL    NRBC 0.0 0  WBC    ABSOLUTE NRBC 0.00 0.00 - 0.01 K/uL    NEUTROPHILS 54 32 - 75 %    LYMPHOCYTES 38 12 - 49 %    MONOCYTES 7 5 - 13 %    EOSINOPHILS 1 0 - 7 %    BASOPHILS 0 0 - 1 %    IMMATURE GRANULOCYTES 0 0.0 - 0.5 %    ABS. NEUTROPHILS 3.7 1.8 - 8.0 K/UL    ABS. LYMPHOCYTES 2.7 0.8 - 3.5 K/UL    ABS. MONOCYTES 0.5 0.0 - 1.0 K/UL    ABS. EOSINOPHILS 0.1 0.0 - 0.4 K/UL    ABS. BASOPHILS 0.0 0.0 - 0.1 K/UL    ABS. IMM. GRANS. 0.0 0.00 - 0.04 K/UL    DF AUTOMATED     METABOLIC PANEL, COMPREHENSIVE    Collection Time: 12/09/22 10:32 PM   Result Value Ref Range    Sodium 141 136 - 145 mmol/L    Potassium 3.7 3.5 - 5.1 mmol/L    Chloride 109 (H) 97 - 108 mmol/L    CO2 24 21 - 32 mmol/L    Anion gap 8 5 - 15 mmol/L    Glucose 132 (H) 65 - 100 mg/dL    BUN 13 6 - 20 MG/DL    Creatinine 0.98 0.55 - 1.02 MG/DL    BUN/Creatinine ratio 13 12 - 20      eGFR >60 >60 ml/min/1.73m2    Calcium 8.6 8.5 - 10.1 MG/DL    Bilirubin, total 0.4 0.2 - 1.0 MG/DL    ALT (SGPT) 38 12 - 78 U/L    AST (SGOT) 29 15 - 37 U/L    Alk. phosphatase 73 45 - 117 U/L    Protein, total 7.4 6.4 - 8.2 g/dL    Albumin 3.5 3.5 - 5.0 g/dL    Globulin 3.9 2.0 - 4.0 g/dL    A-G Ratio 0.9 (L) 1.1 - 2.2     SAMPLES BEING HELD    Collection Time: 12/09/22 10:32 PM   Result Value Ref Range    SAMPLES BEING HELD  1GRN, 1SST, 1BLU, 1RED     COMMENT        Add-on orders for these samples will be processed based on acceptable specimen integrity and analyte stability, which may vary by analyte.    LIPASE    Collection Time: 12/09/22 10:32 PM   Result Value Ref Range    Lipase 260 73 - 393 U/L   MAGNESIUM    Collection Time: 12/09/22 10:32 PM   Result Value Ref Range    Magnesium 2.2 1.6 - 2.4 mg/dL   PHOSPHORUS    Collection Time: 12/09/22 10:32 PM   Result Value Ref Range    Phosphorus 2.6 2.6 - 4.7 MG/DL   PROTHROMBIN TIME + INR    Collection Time: 12/09/22 10:32 PM   Result Value Ref Range    INR 1.0 0.9 - 1.1      Prothrombin time 10.3 9.0 - 11.1 sec   PTT    Collection Time: 12/09/22 10:32 PM   Result Value Ref Range    aPTT 24.5 22.1 - 31.0 sec    aPTT, therapeutic range     58.0 - 77.0 SECS   OCCULT BLOOD, STOOL    Collection Time: 12/09/22 11:26 PM   Result Value Ref Range    Occult blood, stool Negative NEG     URINALYSIS W/MICROSCOPIC    Collection Time: 12/10/22 12:57 AM   Result Value Ref Range    Color DARK YELLOW      Appearance CLEAR CLEAR      Specific gravity 1.010 1.003 - 1.030      pH (UA) 8.0 5.0 - 8.0      Protein Negative NEG mg/dL    Glucose Negative NEG mg/dL    Ketone TRACE (A) NEG mg/dL    Bilirubin Negative NEG      Blood Negative NEG      Urobilinogen 0.2 0.2 - 1.0 EU/dL    Nitrites Negative NEG      Leukocyte Esterase TRACE (A) NEG      WBC 0-4 0 - 4 /hpf    RBC 0-5 0 - 5 /hpf    Epithelial cells FEW FEW /lpf    Bacteria Negative NEG /hpf    Hyaline cast 0-2 0 - 2 /lpf   URINE CULTURE HOLD SAMPLE    Collection Time: 12/10/22 12:57 AM    Specimen: Urine   Result Value Ref Range    Urine culture hold        Urine on hold in Microbiology dept for 2 days. If unpreserved urine is submitted, it cannot be used for addtional testing after 24 hours, recollection will be required. SAMPLES BEING HELD    Collection Time: 12/10/22  4:00 AM   Result Value Ref Range    SAMPLES BEING HELD 1PST,1LAV     COMMENT        Add-on orders for these samples will be processed based on acceptable specimen integrity and analyte stability, which may vary by analyte.    METABOLIC PANEL, BASIC    Collection Time: 12/10/22 10:51 AM   Result Value Ref Range    Sodium 140 136 - 145 mmol/L    Potassium 3.8 3.5 - 5.1 mmol/L    Chloride 110 (H) 97 - 108 mmol/L    CO2 27 21 - 32 mmol/L    Anion gap 3 (L) 5 - 15 mmol/L    Glucose 99 65 - 100 mg/dL    BUN 10 6 - 20 MG/DL    Creatinine 0.91 0.55 - 1.02 MG/DL    BUN/Creatinine ratio 11 (L) 12 - 20      eGFR >60 >60 ml/min/1.73m2    Calcium 8.7 8.5 - 10.1 MG/DL   HEMOGLOBIN    Collection Time: 12/10/22 10:51 AM   Result Value Ref Range    HGB 13.6 11.5 - 16.0 g/dL         Assessment:     Active Problems:    GI bleed (12/10/2022)    I have discussed differential diagnosis with the patient; it is possible that this is a hemorrhoidal bleed; bleeding pattern is consistent with that diagnosis but does not exclude other diagnoses. Plan:   Patient wishes a colonoscopy as soon as possible; will order preparation tomorrow in hopes of obtaining the examination on the 12th.     Dr Debbie Bass will follow with you

## 2022-12-10 NOTE — H&P
[facility]  [address] [facility], [address] [place] [address]  [phone]    Admission History and Physical      NAME:  Charmaine Apple   :   1965   MRN:  [id number]     PCP:  Bravo Castro MD     Date/Time of service:  12/10/2022  2:53 AM        Subjective:     CHIEF COMPLAINT:  Blood in stool     HISTORY OF PRESENT ILLNESS:     Ms. Morena Chinchilla is a 56 y.o.  female with a pmh sig for breast cancer currently on tamoxifen and previous gi bleed with normal colonoscopy who presents to the ed with the above complain. Pain noticed to have multiple episodes of bright red blood in stool over the past several days. She does has hemorrhoids but did not seem to have any pain or swelling. She has a negative hemoccult in the ed. CT abdomen shows no active bleeding. She noticed again large amount of bloody content on last bowl movement yesterday. She had 3 episodes yesterday. She denies any lightheadedness. Allergies   Allergen Reactions    Honeydew Anaphylaxis    Lidocaine-Epinephrine Palpitations       Prior to Admission medications    Medication Sig Start Date End Date Taking? Authorizing Provider   amoxicillin (AMOXIL) 875 mg tablet Take 1 Tablet by mouth two (2) times a day. 10/27/22   Abram Reeves NP   nitrofurantoin, macrocrystal-monohydrate, (MACROBID) 100 mg capsule Take 1 Capsule by mouth two (2) times a day. 10/20/22   Bravo Castro MD   mirtazapine (REMERON) 7.5 mg tablet Take 7.5 mg by mouth nightly. Provider, Historical   tamoxifen (NOLVADEX) 20 mg tablet  3/2/20   Provider, Historical   cholecalciferol, vitamin D3, 50 mcg (2,000 unit) tab Take  by mouth. Provider, Historical   omega 3-dha-epa-fish oil (Fish Oil) 100-160-1,000 mg cap Take  by mouth. Provider, Historical   b complex-vitamin c-folic acid (DIALYVITE) 100-1 mg tab tablet Take 1 Tab by mouth daily.     Provider, Historical       Past Medical History:   Diagnosis Date Cardiomyopathy ([facility] [place] [address]     post partum cardiomyothy    H/O endoscopy 2020    Hemorrhoid     Hiatal hernia     History of breast cancer 2018    left breast, lumpectomy, radtiation no chemotherapy stage 1 , dr. Radha Alvarez        Past Surgical History:   Procedure Laterality Date    HX BREAST LUMPECTOMY Left 2018    HX COLONOSCOPY  2017    endoscopy with dr. Browning [place] History     Tobacco Use    Smoking status: Never    Smokeless tobacco: Never   Substance Use Topics    Alcohol use: No        Family History   Problem Relation Age of Onset    Hypertension Mother     High Cholesterol Mother     OSTEOARTHRITIS Mother     Hypertension Father     High Cholesterol Father     Prostate Cancer Father     Cancer Maternal Grandmother         lung    Cancer Paternal Grandmother         pancreatic    Stroke Maternal Grandfather         Review of Systems:  (bold if positive, if negative)    Gen:  Eyes:  ENT:  CVS:  Pulm:  GI:  :  MS:  Skin:  Psych:  Endo:  Hem:  Renal:  Neuro:          Objective:      VITALS:    Vital signs reviewed; most recent are:    Visit Vitals  /67 (BP 1 Location: Left upper arm, BP Patient Position: At rest;Sitting)   Pulse 76   Temp 97.5 °F (36.4 °C)   Resp 17   Wt 63.4 kg (139 lb 12.4 oz)   SpO2 99%   BMI 21.25 kg/m²     SpO2 Readings from Last 6 Encounters:   12/09/22 99%   10/20/22 98%   07/28/22 97%   08/26/21 97%   11/11/20 99%   05/05/20 99%        No intake or output data in the 24 hours ending 12/10/22 0253     Exam:     Physical Exam:    Gen:  Well-developed, well-nourished, in no acute distress  HEENT:  Pink conjunctivae, PERRL, hearing intact to voice, moist mucous membranes  Neck:  Supple, without masses, thyroid non-tender  Resp:  No accessory muscle use, clear breath sounds without wheezes rales or rhonchi  Card:  No murmurs, normal S1, S2 without thrills, bruits or peripheral edema  Abd:  Soft, non-tender, non-distended, normoactive bowel sounds are present, no palpable organomegaly and no detectable hernias  Lymph:  No cervical adenopathy  Musc:  No cyanosis or clubbing  Skin:  No rashes or ulcers, skin turgor is good  Neuro:  Cranial nerves 3-12 are grossly intact,  strength is 5/5 bilaterally, dorsi / plantarflexion strength is 5/5 bilaterally, follows commands appropriately  Psych:  Alert with good insight. Oriented to person, place, and time      Labs:    Recent Labs     12/09/22 2232   WBC 7.0   HGB 14.6   HCT 43.0        Recent Labs     12/09/22 2232      K 3.7   *   CO2 24   *   BUN 13   CREA 0.98   CA 8.6   MG 2.2   PHOS 2.6   ALB 3.5   TBILI 0.4   ALT 38     No results found for: GLUCPOC  No results for input(s): PH, PCO2, PO2, HCO3, FIO2 in the last 72 hours. Recent Labs     12/09/22 2232   INR 1.0       Radiology and EKG reviewed:       **Old Records reviewed in [facility]**       Assessment/Plan:       Active Problems:    GI bleed (12/10/2022)  Appears to be lower gi bleed with stable hb.  Possibly hemorrhoid?   - observation   - tele monitoring   - gi consult   - vitals as per unit   - no protonix needed   - hb q8h      Breast cancer hx - continue home regimen and oncology follow up     Risk of deterioration: low      Total time spent with patient: 30 Minutes **I personally saw and examined the patient during this time period**                 Care Plan discussed with: Patient    Discussed:  Code Status and Care Plan    Prophylaxis:  SCD's    Probable Disposition:  Home w/Family           ___________________________________________________    Attending Physician: Mauricio Coffman MD

## 2022-12-10 NOTE — ED TRIAGE NOTES
\"I feel cold and shaky and am bleeding from my rectum and when I urinate. I noticed blood in the urine yesterday and in the stool but thought it was hemorrhoids, but its dark red blood. \"  Pt states it was intermittent bleeding but is now consistent when she uses the restroom.  PMH: Postpartum myopathy (followed by cardiology) Breast CA 2018, UTIs

## 2022-12-11 LAB
COVID-19 RAPID TEST, COVR: NOT DETECTED
FERRITIN SERPL-MCNC: 75 NG/ML (ref 8–252)
FOLATE SERPL-MCNC: 40 NG/ML (ref 5–21)
HAPTOGLOB SERPL-MCNC: 100 MG/DL (ref 30–200)
HGB BLD-MCNC: 13.2 G/DL (ref 11.5–16)
IRON SATN MFR SERPL: 27 % (ref 20–50)
IRON SERPL-MCNC: 101 UG/DL (ref 35–150)
LDH SERPL L TO P-CCNC: 187 U/L (ref 81–246)
PROCALCITONIN SERPL-MCNC: <0.05 NG/ML
RETICS # AUTO: 0.08 M/UL (ref 0.02–0.08)
RETICS/RBC NFR AUTO: 1.9 % (ref 0.7–2.1)
SOURCE, COVRS: NORMAL
TIBC SERPL-MCNC: 380 UG/DL (ref 250–450)
VIT B12 SERPL-MCNC: 1062 PG/ML (ref 193–986)

## 2022-12-11 PROCEDURE — 83615 LACTATE (LD) (LDH) ENZYME: CPT

## 2022-12-11 PROCEDURE — 74011000250 HC RX REV CODE- 250: Performed by: INTERNAL MEDICINE

## 2022-12-11 PROCEDURE — 74011000250 HC RX REV CODE- 250: Performed by: HOSPITALIST

## 2022-12-11 PROCEDURE — 83540 ASSAY OF IRON: CPT

## 2022-12-11 PROCEDURE — 83010 ASSAY OF HAPTOGLOBIN QUANT: CPT

## 2022-12-11 PROCEDURE — 74011250636 HC RX REV CODE- 250/636: Performed by: INTERNAL MEDICINE

## 2022-12-11 PROCEDURE — 74011250636 HC RX REV CODE- 250/636: Performed by: HOSPITALIST

## 2022-12-11 PROCEDURE — 86710 INFLUENZA VIRUS ANTIBODY: CPT

## 2022-12-11 PROCEDURE — G0378 HOSPITAL OBSERVATION PER HR: HCPCS

## 2022-12-11 PROCEDURE — 82746 ASSAY OF FOLIC ACID SERUM: CPT

## 2022-12-11 PROCEDURE — 36415 COLL VENOUS BLD VENIPUNCTURE: CPT

## 2022-12-11 PROCEDURE — 82728 ASSAY OF FERRITIN: CPT

## 2022-12-11 PROCEDURE — 82607 VITAMIN B-12: CPT

## 2022-12-11 PROCEDURE — 87635 SARS-COV-2 COVID-19 AMP PRB: CPT

## 2022-12-11 PROCEDURE — 85045 AUTOMATED RETICULOCYTE COUNT: CPT

## 2022-12-11 PROCEDURE — 74011250637 HC RX REV CODE- 250/637: Performed by: HOSPITALIST

## 2022-12-11 PROCEDURE — 84145 PROCALCITONIN (PCT): CPT

## 2022-12-11 PROCEDURE — 85018 HEMOGLOBIN: CPT

## 2022-12-11 RX ORDER — POLYETHYLENE GLYCOL 3350, SODIUM SULFATE ANHYDROUS, SODIUM BICARBONATE, SODIUM CHLORIDE, POTASSIUM CHLORIDE 236; 22.74; 6.74; 5.86; 2.97 G/4L; G/4L; G/4L; G/4L; G/4L
4000 POWDER, FOR SOLUTION ORAL ONCE
Status: COMPLETED | OUTPATIENT
Start: 2022-12-11 | End: 2022-12-11

## 2022-12-11 RX ORDER — DEXTROSE, SODIUM CHLORIDE, AND POTASSIUM CHLORIDE 5; .45; .3 G/100ML; G/100ML; G/100ML
INJECTION INTRAVENOUS CONTINUOUS
Status: DISCONTINUED | OUTPATIENT
Start: 2022-12-11 | End: 2022-12-12 | Stop reason: HOSPADM

## 2022-12-11 RX ADMIN — POTASSIUM CHLORIDE, DEXTROSE MONOHYDRATE AND SODIUM CHLORIDE: 300; 5; 450 INJECTION, SOLUTION INTRAVENOUS at 09:25

## 2022-12-11 RX ADMIN — POLYETHYLENE GLYCOL 3350, SODIUM SULFATE ANHYDROUS, SODIUM BICARBONATE, SODIUM CHLORIDE, POTASSIUM CHLORIDE 4000 ML: 236; 22.74; 6.74; 5.86; 2.97 POWDER, FOR SOLUTION ORAL at 13:55

## 2022-12-11 RX ADMIN — POTASSIUM CHLORIDE, DEXTROSE MONOHYDRATE AND SODIUM CHLORIDE: 300; 5; 450 INJECTION, SOLUTION INTRAVENOUS at 17:49

## 2022-12-11 RX ADMIN — MIRTAZAPINE 7.5 MG: 15 TABLET, FILM COATED ORAL at 21:30

## 2022-12-11 RX ADMIN — SODIUM CHLORIDE, PRESERVATIVE FREE 10 ML: 5 INJECTION INTRAVENOUS at 21:34

## 2022-12-11 RX ADMIN — TAMOXIFEN CITRATE 20 MG: 10 TABLET ORAL at 17:49

## 2022-12-11 RX ADMIN — SODIUM CHLORIDE, PRESERVATIVE FREE 10 ML: 5 INJECTION INTRAVENOUS at 06:04

## 2022-12-11 RX ADMIN — SODIUM CHLORIDE 100 ML/HR: 9 INJECTION, SOLUTION INTRAVENOUS at 01:48

## 2022-12-11 NOTE — PROGRESS NOTES
Bedside and Verbal shift change report given to Ludwin Bustamante RN (oncoming nurse) by Mary Birmingham RN (offgoing nurse). Report included the following information SBAR, Kardex, ED Summary, Intake/Output, MAR, and Recent Results.

## 2022-12-11 NOTE — PROGRESS NOTES
[facility]  [address] [place] [address], [facility]  [phone]    Hospitalist Progress Note      NAME: Johanna Garrison   :  1965  MRM:  [id number]    Date/Time of service 2022  8:43 AM          Assessment and Plan:     GI bleed - POA, new, mild, painless. She had a colonoscopy 1 year ago after a similar episode, with hemorrhoids as only finding. Bleeding has stopped. Stable vitals and no anemia. GI and patient plan another colonoscopy. Delayed by weekend. Tachycardia - New this AM.  Mild. Asymptomatic. Check EKG. Subjective:     Chief Complaint:  bleeding stopped    ROS:  (bold if positive, if negative)    Tolerating PT  Tolerating clear Diet        Objective:     Last 24hrs VS reviewed since prior progress note.  Most recent are:    Visit Vitals  BP 95/60 (BP 1 Location: Right arm, BP Patient Position: At rest)   Pulse (!) 104   Temp 97 °F (36.1 °C)   Resp 16   Ht 5' 8\" (1.727 m)   Wt 63.4 kg (139 lb 12.4 oz)   SpO2 95%   Breastfeeding No   BMI 21.25 kg/m²     SpO2 Readings from Last 6 Encounters:   22 95%   10/20/22 98%   22 97%   21 97%   20 99%   20 99%          Intake/Output Summary (Last 24 hours) at 2022 0843  Last data filed at 2022 0559  Gross per 24 hour   Intake 2758.33 ml   Output --   Net 2758.33 ml        Physical Exam:    Gen:  Well-developed, well-nourished, in no acute distress  HEENT:  Pink conjunctivae, PERRL, hearing intact to voice, moist mucous membranes  Neck:  Supple, without masses, thyroid non-tender  Resp:  No accessory muscle use, clear breath sounds without wheezes rales or rhonchi  Card:  No murmurs, tachycardic S1, S2 without thrills, bruits or peripheral edema  Abd:  Soft, non-tender, non-distended, normoactive bowel sounds are present, no mass  Lymph:  No cervical or inguinal adenopathy  Musc:  No cyanosis or clubbing  Skin:  No rashes or ulcers, skin turgor is good  Neuro: Cranial nerves are grossly intact, no focal motor weakness, follows commands appropriately  Psych:  Good insight, oriented to person, place and time, alert    Telemetry reviewed:   normal sinus rhythm  __________________________________________________________________  Medications Reviewed: (see below)  Medications:     Current Facility-Administered Medications   Medication Dose Route Frequency    sodium chloride (NS) flush 5-40 mL  5-40 mL IntraVENous Q8H    sodium chloride (NS) flush 5-40 mL  5-40 mL IntraVENous PRN    0.9% sodium chloride infusion  100 mL/hr IntraVENous CONTINUOUS    tamoxifen (NOLVADEX) tablet 20 mg  20 mg Oral QPM    mirtazapine (REMERON) tablet 7.5 mg  7.5 mg Oral QHS        Lab Data Reviewed: (see below)  Lab Review:     Recent Labs     12/10/22  1051 12/09/22 2232   WBC  --  7.0   HGB 13.6 14.6   HCT  --  43.0   PLT  --  271     Recent Labs     12/10/22  1051 12/09/22  2232    141   K 3.8 3.7   * 109*   CO2 27 24   GLU 99 132*   BUN 10 13   CREA 0.91 0.98   CA 8.7 8.6   MG  --  2.2   PHOS  --  2.6   ALB  --  3.5   TBILI  --  0.4   ALT  --  38   INR  --  1.0     No results found for: GLUCPOC  No results for input(s): PH, PCO2, PO2, HCO3, FIO2 in the last 72 hours. Recent Labs     12/09/22 2232   INR 1.0     All Micro Results       Procedure Component Value Units Date/Time    COVID-19 RAPID TEST [[id number]]     Order Status: Sent     URINE CULTURE HOLD SAMPLE [[id number]] Collected: 12/10/22 0057    Order Status: Completed Specimen: Urine Updated: 12/10/22 0109     Urine culture hold       Urine on hold in Microbiology dept for 2 days. If unpreserved urine is submitted, it cannot be used for addtional testing after 24 hours, recollection will be required. Other pertinent lab: none    Total time spent with patient: 30 Minutes I personally reviewed chart, notes, data and current medications in the medical record.   I have personally examined and treated the patient at bedside during this period. To assist coordination of care and communication with nursing and staff, this note may be preliminary early in the day, but finalized by end of the day.                  Care Plan discussed with: Patient, Care Manager, Nursing Staff, and >50% of time spent in counseling and coordination of care    Discussed:  Care Plan and D/C Planning    Prophylaxis:  SCD's and H2B/PPI    Disposition:  Home w/Family           ___________________________________________________    Attending Physician: Jennifer Barker MD

## 2022-12-12 ENCOUNTER — ANESTHESIA (OUTPATIENT)
Dept: ENDOSCOPY | Age: 57
End: 2022-12-12
Payer: COMMERCIAL

## 2022-12-12 ENCOUNTER — ANESTHESIA EVENT (OUTPATIENT)
Dept: ENDOSCOPY | Age: 57
End: 2022-12-12
Payer: COMMERCIAL

## 2022-12-12 VITALS
HEART RATE: 65 BPM | WEIGHT: 139.77 LBS | TEMPERATURE: 97.4 F | HEIGHT: 68 IN | OXYGEN SATURATION: 98 % | RESPIRATION RATE: 15 BRPM | BODY MASS INDEX: 21.18 KG/M2 | SYSTOLIC BLOOD PRESSURE: 119 MMHG | DIASTOLIC BLOOD PRESSURE: 76 MMHG

## 2022-12-12 LAB
HCG UR QL: NEGATIVE
HGB BLD-MCNC: 13.7 G/DL (ref 11.5–16)

## 2022-12-12 PROCEDURE — 74011000250 HC RX REV CODE- 250: Performed by: HOSPITALIST

## 2022-12-12 PROCEDURE — 74011250636 HC RX REV CODE- 250/636: Performed by: INTERNAL MEDICINE

## 2022-12-12 PROCEDURE — G0378 HOSPITAL OBSERVATION PER HR: HCPCS

## 2022-12-12 PROCEDURE — 74011250636 HC RX REV CODE- 250/636: Performed by: NURSE ANESTHETIST, CERTIFIED REGISTERED

## 2022-12-12 PROCEDURE — 76060000031 HC ANESTHESIA FIRST 0.5 HR: Performed by: INTERNAL MEDICINE

## 2022-12-12 PROCEDURE — 2709999900 HC NON-CHARGEABLE SUPPLY: Performed by: INTERNAL MEDICINE

## 2022-12-12 PROCEDURE — 85018 HEMOGLOBIN: CPT

## 2022-12-12 PROCEDURE — 36415 COLL VENOUS BLD VENIPUNCTURE: CPT

## 2022-12-12 PROCEDURE — 81025 URINE PREGNANCY TEST: CPT

## 2022-12-12 PROCEDURE — 76040000019: Performed by: INTERNAL MEDICINE

## 2022-12-12 RX ORDER — SODIUM CHLORIDE 9 MG/ML
INJECTION, SOLUTION INTRAVENOUS
Status: DISCONTINUED | OUTPATIENT
Start: 2022-12-12 | End: 2022-12-12 | Stop reason: HOSPADM

## 2022-12-12 RX ORDER — ATROPINE SULFATE 0.1 MG/ML
0.4 INJECTION INTRAVENOUS
Status: DISCONTINUED | OUTPATIENT
Start: 2022-12-12 | End: 2022-12-12 | Stop reason: HOSPADM

## 2022-12-12 RX ORDER — MIDAZOLAM HYDROCHLORIDE 1 MG/ML
.25-5 INJECTION, SOLUTION INTRAMUSCULAR; INTRAVENOUS
Status: DISCONTINUED | OUTPATIENT
Start: 2022-12-12 | End: 2022-12-12 | Stop reason: HOSPADM

## 2022-12-12 RX ORDER — DEXTROMETHORPHAN/PSEUDOEPHED 2.5-7.5/.8
1.2 DROPS ORAL
Status: DISCONTINUED | OUTPATIENT
Start: 2022-12-12 | End: 2022-12-12 | Stop reason: HOSPADM

## 2022-12-12 RX ORDER — PROPOFOL 10 MG/ML
INJECTION, EMULSION INTRAVENOUS
Status: DISCONTINUED | OUTPATIENT
Start: 2022-12-12 | End: 2022-12-12 | Stop reason: HOSPADM

## 2022-12-12 RX ORDER — NALOXONE HYDROCHLORIDE 0.4 MG/ML
0.4 INJECTION, SOLUTION INTRAMUSCULAR; INTRAVENOUS; SUBCUTANEOUS
Status: DISCONTINUED | OUTPATIENT
Start: 2022-12-12 | End: 2022-12-12 | Stop reason: HOSPADM

## 2022-12-12 RX ORDER — FLUMAZENIL 0.1 MG/ML
0.2 INJECTION INTRAVENOUS
Status: DISCONTINUED | OUTPATIENT
Start: 2022-12-12 | End: 2022-12-12 | Stop reason: HOSPADM

## 2022-12-12 RX ORDER — SODIUM CHLORIDE 9 MG/ML
50 INJECTION, SOLUTION INTRAVENOUS CONTINUOUS
Status: DISCONTINUED | OUTPATIENT
Start: 2022-12-12 | End: 2022-12-12 | Stop reason: HOSPADM

## 2022-12-12 RX ORDER — EPINEPHRINE 0.1 MG/ML
1 INJECTION INTRACARDIAC; INTRAVENOUS
Status: DISCONTINUED | OUTPATIENT
Start: 2022-12-12 | End: 2022-12-12 | Stop reason: HOSPADM

## 2022-12-12 RX ADMIN — POTASSIUM CHLORIDE, DEXTROSE MONOHYDRATE AND SODIUM CHLORIDE: 300; 5; 450 INJECTION, SOLUTION INTRAVENOUS at 11:49

## 2022-12-12 RX ADMIN — SODIUM CHLORIDE, PRESERVATIVE FREE 10 ML: 5 INJECTION INTRAVENOUS at 06:00

## 2022-12-12 RX ADMIN — SODIUM CHLORIDE: 9 INJECTION, SOLUTION INTRAVENOUS at 16:30

## 2022-12-12 RX ADMIN — POTASSIUM CHLORIDE, DEXTROSE MONOHYDRATE AND SODIUM CHLORIDE: 300; 5; 450 INJECTION, SOLUTION INTRAVENOUS at 02:48

## 2022-12-12 RX ADMIN — PROPOFOL 500 MCG/KG/MIN: 10 INJECTION, EMULSION INTRAVENOUS at 16:42

## 2022-12-12 NOTE — PERIOP NOTES
Avila Stahl Severe  1965  [id number]    Situation:    Scheduled Procedure: Procedure(s):  COLONOSCOPY  Verbal report received from: Randi Silvestre RN  Preoperative diagnosis: anemia    Background:    Procedure: Procedure(s):  COLONOSCOPY  Physician performing procedure; Dr. Atkinson Erm Status/Last PO Intake: midnight    Pregnancy Test:Not applicable If yes, result: none    Is the patient taking Blood Thinners: NO If yes, list:  and last taken   Is the patient diabetic:no       If yes, what was the last BS:    Time taken? Anything given? no           Does the patient have a Pacemaker/Defibrillator in place?: no   Does the patient need antibiotics before/during/after procedure: no   If the patient is having a colon, How much prep was drank? completed   What were the Colon prep results? Yellow liquid   Does the patient have SCD in place:no   Is patient on CONTACT precautions:no        If yes, what kind of CONTACT precautions:     Assessment:  Are the vital signs stable prior to patient coming to ENDO?  yes  Is the patient alert/oriented and able to sign consent for the procedures:yes  How does the patient's abdomen feel prior to coming to ENDO? round and soft yes   Does the patient have a patient IV in place?  yes     Recommendation:  Family or Friend present no     Permission to share finding with Family or Friend n/a

## 2022-12-12 NOTE — PROCEDURES
Therese Suárez M.D.  [phone]            2022          Colonoscopy Operative Report  Evette Guido  :  1965  [facility] Medical Record Number:  [id number]      Indications:   hematochezia      :  Ayan Ray MD    Assistant -- None  Implants -- None    Referring Provider: Davin Brar MD    Sedation:  MAC anesthesia Propofol    Pre-Procedural Exam:      Airway: clear,  No airway problems anticipated  Heart: RRR, without gallops or rubs  Lungs: clear bilaterally without wheezes, crackles, or rhonchi  Abdomen: soft, nontender, nondistended, bowel sounds present  Mental Status: awake, alert and oriented to person, place and time     Procedure Details:  After informed consent was obtained with all risks and benefits of procedure explained and preoperative exam completed, the patient was taken to the endoscopy suite and placed in the left lateral decubitus position. Upon sequential sedation as per above, a digital rectal exam was performed. The Olympus videocolonoscope  was inserted in the rectum and carefully advanced to the terminal ileum. The quality of preparation was good. The colonoscope was slowly withdrawn with careful inspection and evaluation between folds. Retroflexion in the rectum was performed. Findings:   Terminal Ileum: normal  Cecum: normal  Ascending Colon: normal  Transverse Colon: normal  Descending Colon: normal  Sigmoid: normal  Rectum: grade II internal hemorrhoids    Interventions:  none    Specimen Removed:  none    Complications: None. EBL:  None. Impression:  Grade II internal hemorrhoids, otherwise normal exam    Recommendations:  -You will be set up for banding of your hemorrhoids. If you have not heard from the office in a weeks time please call and have this set up      Discharge Disposition:  Home in the company of a  when able to ambulate.     Ayan Ray MD  2022  4:56 PM

## 2022-12-12 NOTE — DISCHARGE INSTRUCTIONS
Patient Discharge Instructions    Abigail Raheel / [id number] : 1965    Admitted 2022 Discharged: 2022     Primary Diagnoses  @Rprob@    Take Home Medications     It is important that you take the medication exactly as they are prescribed. Keep your medication in the bottles provided by the pharmacist and keep a list of the medication names, dosages, and times to be taken in your wallet. Do not take other medications without consulting your doctor. What to do at Home    Recommended diet: Regular Diet    Recommended activity: Activity as tolerated    If you experience worse bleeding, please follow up with GI. Follow-up with your PCP in a few weeks    [unfilled]     Information obtained by :  I understand that if any problems occur once I am at home I am to contact my physician. I understand and acknowledge receipt of the instructions indicated above.                                                                                                                                            Physician's or R.N.'s Signature                                                                  Date/Time                                                                                                                                              Patient or Representative Signature                                                          Date/Time

## 2022-12-12 NOTE — PROGRESS NOTES
Jyothi Gonsalez Bowie  1965  [id number]    Situation:  Verbal report received from: [address] [facility]  Procedure: Procedure(s):  COLONOSCOPY    Background:    Preoperative diagnosis: anemia  Postoperative diagnosis: Internal Hemorrhoids    :  Dr. Sallie Morris  Assistant(s): Endoscopy RN-2: Davide Anguiano RN; Beth Hoang RN    Specimens: * No specimens in log *  H. Pylori  no    Assessment:  Intra-procedure medications     Anesthesia gave intra-procedure sedation and medications, see anesthesia flow sheet yes    Intravenous fluids: NS@ KVO     Vital signs stable yes    Abdominal assessment: round and soft yes    Recommendation:  Discharge patient per MD order ba.   Return to floor yes  Family or Friend family  Permission to share finding with family or friend yes

## 2022-12-12 NOTE — PERIOP NOTES
Report from Winnie Willis CRNA, see anesthesia record. ABD remains soft and non-tender post procedure. Pt has no complaints at this time and tolerated the procedure well. Endoscope was pre-cleaned at bedside immediately following procedure by [facility].

## 2022-12-12 NOTE — PROGRESS NOTES
Met with patient to review and sign OBS letter. 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

## 2022-12-12 NOTE — PROGRESS NOTES
[facility]  [address], [facility] [address]  [phone]    Hospitalist Progress Note      NAME: Cal Orantes   :  1965  MRM:  [id number]    Date/Time of service 2022  10:07 AM          Assessment and Plan:     GI bleed - POA, new, mild, painless. She had a colonoscopy 1 year ago after a similar bleeding episode, with hemorrhoids as only finding. Bleeding has stopped >36 hrs now. Stable vitals. No anemia. GI and patient plan another colonoscopy today. Delayed by weekend. Tachycardia - New this AM.  Mild. Asymptomatic. Check EKG. Subjective:     Chief Complaint:  awaiting endoscopy    ROS:  (bold if positive, if negative)    Tolerating PT   NPO        Objective:     Last 24hrs VS reviewed since prior progress note.  Most recent are:    Visit Vitals  /73 (BP 1 Location: Left upper arm, BP Patient Position: At rest)   Pulse (!) 59   Temp 97.8 °F (36.6 °C)   Resp 16   Ht 5' 8\" (1.727 m)   Wt 63.4 kg (139 lb 12.4 oz)   SpO2 98%   Breastfeeding No   BMI 21.25 kg/m²     SpO2 Readings from Last 6 Encounters:   22 98%   10/20/22 98%   22 97%   21 97%   20 99%   20 99%          Intake/Output Summary (Last 24 hours) at 2022 0746  Last data filed at 2022 1920  Gross per 24 hour   Intake 6977.08 ml   Output --   Net 6977.08 ml          Physical Exam:    Gen:  Well-developed, well-nourished, in no acute distress  HEENT:  Pink conjunctivae, PERRL, hearing intact to voice, moist mucous membranes  Neck:  Supple, without masses, thyroid non-tender  Resp:  No accessory muscle use, clear breath sounds without wheezes rales or rhonchi  Card:  No murmurs, tachycardic S1, S2 without thrills, bruits or peripheral edema  Abd:  Soft, non-tender, non-distended, normoactive bowel sounds are present, no mass  Lymph:  No cervical or inguinal adenopathy  Musc:  No cyanosis or clubbing  Skin:  No rashes or ulcers, skin turgor is good  Neuro:  Cranial nerves are grossly intact, no focal motor weakness, follows commands appropriately  Psych:  Good insight, oriented to person, place and time, alert    Telemetry reviewed:   normal sinus rhythm  __________________________________________________________________  Medications Reviewed: (see below)  Medications:     Current Facility-Administered Medications   Medication Dose Route Frequency    dextrose 5% - 0.45% NaCl with KCl 40 mEq/L infusion   IntraVENous CONTINUOUS    sodium chloride (NS) flush 5-40 mL  5-40 mL IntraVENous Q8H    sodium chloride (NS) flush 5-40 mL  5-40 mL IntraVENous PRN    tamoxifen (NOLVADEX) tablet 20 mg  20 mg Oral QPM    mirtazapine (REMERON) tablet 7.5 mg  7.5 mg Oral QHS        Lab Data Reviewed: (see below)  Lab Review:     Recent Labs     12/11/22 0936 12/10/22  1051 12/09/22 2232   WBC  --   --  7.0   HGB 13.2 13.6 14.6   HCT  --   --  43.0   PLT  --   --  271       Recent Labs     12/10/22  1051 12/09/22 2232    141   K 3.8 3.7   * 109*   CO2 27 24   GLU 99 132*   BUN 10 13   CREA 0.91 0.98   CA 8.7 8.6   MG  --  2.2   PHOS  --  2.6   ALB  --  3.5   TBILI  --  0.4   ALT  --  38   INR  --  1.0       No results found for: GLUCPOC  No results for input(s): PH, PCO2, PO2, HCO3, FIO2 in the last 72 hours. Recent Labs     12/09/22 2232   INR 1.0       All Micro Results       Procedure Component Value Units Date/Time    COVID-19 RAPID TEST [[id number]] Collected: 12/11/22 1059    Order Status: Completed Specimen: Nasopharyngeal Updated: 12/11/22 1138     Specimen source Nasopharyngeal        COVID-19 rapid test Not detected        Comment: Rapid Abbott ID Now       Rapid NAAT:  The specimen is NEGATIVE for SARS-CoV-2, the novel coronavirus associated with COVID-19.        Negative results should be treated as presumptive and, if inconsistent with clinical signs and symptoms or necessary for patient management, should be tested with an alternative molecular assay.  Negative results do not preclude SARS-CoV-2 infection and should not be used as the sole basis for patient management decisions. This test has been authorized by the FDA under an Emergency Use Authorization (EUA) for use by authorized laboratories. Fact sheet for Healthcare Providers:  http://www.tiffani.jennifer/  Fact sheet for Patients: http://www.tiffani.jennifer/       Methodology: Isothermal Nucleic Acid Amplification         URINE CULTURE HOLD SAMPLE [[id number]] Collected: 12/10/22 0057    Order Status: Completed Specimen: Urine Updated: 12/10/22 0109     Urine culture hold       Urine on hold in Microbiology dept for 2 days. If unpreserved urine is submitted, it cannot be used for addtional testing after 24 hours, recollection will be required. Other pertinent lab: none    Total time spent with patient: 30 Minutes I personally reviewed chart, notes, data and current medications in the medical record. I have personally examined and treated the patient at bedside during this period. To assist coordination of care and communication with nursing and staff, this note may be preliminary early in the day, but finalized by end of the day.                  Care Plan discussed with: Patient, Care Manager, Nursing Staff, and >50% of time spent in counseling and coordination of care    Discussed:  Care Plan and D/C Planning    Prophylaxis:  SCD's and H2B/PPI    Disposition:  Home w/Family           ___________________________________________________    Attending Physician: Hemal Renee MD

## 2022-12-12 NOTE — DISCHARGE SUMMARY
Physician Discharge Summary     Patient ID:  Royal Dec  [id number]  56 y.o.  1965    Admit date: 12/9/2022    Discharge date of service and time: 12/12/2022  Greater than 30 minutes were spent providing discharge related services for this patient    Admission Diagnoses: GI bleed [K92.2]    Discharge Diagnoses:    Principal Diagnosis   GI bleed                                             Hospital Course and other diagnoses  GI bleed - POA, new, mild, painless. She had a colonoscopy 1 year ago after a similar bleeding episode, with hemorrhoids as only finding. Bleeding has stopped >36 hrs now. Stable vitals. No anemia. Colonoscopy today was unremarkable, still just hemorrhoids. Delayed by weekend. Tachycardia - Now rosario this AM.  Mild. Asymptomatic. Normal EKG. PCP: Sadie Ly MD    Consults: GI    Significant Diagnostic Studies: See Hospital Course    Discharged home in improved condition.     Discharge Exam:  /73 (BP 1 Location: Left upper arm, BP Patient Position: At rest)   Pulse (!) 59   Temp 97.8 °F (36.6 °C)   Resp 16   Ht 5' 8\" (1.727 m)   Wt 63.4 kg (139 lb 12.4 oz)   SpO2 98%   Breastfeeding No   BMI 21.25 kg/m²       Gen:  Well-developed, well-nourished, in no acute distress  HEENT:  Pink conjunctivae, PERRL, hearing intact to voice, moist mucous membranes  Neck:  Supple, without masses, thyroid non-tender  Resp:  No accessory muscle use, clear breath sounds without wheezes rales or rhonchi  Card:  No murmurs, tachycardic S1, S2 without thrills, bruits or peripheral edema  Abd:  Soft, non-tender, non-distended, normoactive bowel sounds are present, no mass  Lymph:  No cervical or inguinal adenopathy  Musc:  No cyanosis or clubbing  Skin:  No rashes or ulcers, skin turgor is good  Neuro:  Cranial nerves are grossly intact, no focal motor weakness, follows commands appropriately  Psych:  Good insight, oriented to person, place and time, alert    Patient Instructions: Current Discharge Medication List        CONTINUE these medications which have NOT CHANGED    Details   ascorbic acid, vitamin C, (Vitamin C) 500 mg tablet Take 500 mg by mouth daily. elderberry fruit (ELDERBERRY PO) Take 2 Tablets by mouth daily. mirtazapine (REMERON) 7.5 mg tablet Take 7.5 mg by mouth nightly. tamoxifen (NOLVADEX) 20 mg tablet Take 20 mg by mouth daily. cholecalciferol, vitamin D3, 50 mcg (2,000 unit) tab Take 1 Tablet by mouth daily. omega 3-dha-epa-fish oil (Fish Oil) 100-160-1,000 mg cap Take 1 Capsule by mouth daily. b complex-vitamin c-folic acid (DIALYVITE) 100-1 mg tab tablet Take 1 Tab by mouth daily. STOP taking these medications       amoxicillin (AMOXIL) 875 mg tablet Comments:   Reason for Stopping:         nitrofurantoin, macrocrystal-monohydrate, (MACROBID) 100 mg capsule Comments:   Reason for Stopping:             Activity: Activity as tolerated  Diet: Regular Diet  Wound Care: None needed    Follow-up with your PCP in a few weeks.   Follow-up tests/labs - none    Signed:  Carissa Gomez MD  12/12/2022  10:16 AM

## 2022-12-12 NOTE — PERIOP NOTES
TRANSFER - OUT REPORT:    Verbal report given to Karen Soto RN (name) on Edelmira Phoenix  being transferred to 5th floor(unit) for routine post - op       Report consisted of patients Situation, Background, Assessment and   Recommendations(SBAR). Information from the following report(s) Procedure Summary and Recent Results was reviewed with the receiving nurse. Lines:   Peripheral IV 12/12/22 Distal;Posterior;Right Forearm (Active)   Site Assessment Clean, dry, & intact 12/12/22 1502   Phlebitis Assessment 0 12/12/22 1502   Infiltration Assessment 0 12/12/22 1502   Dressing Status Clean, dry, & intact 12/12/22 1502   Dressing Type Tape;Transparent 12/12/22 1502   Hub Color/Line Status Patent; Infusing;Flushed 12/12/22 1502   Action Taken Open ports on tubing capped 12/12/22 1502   Alcohol Cap Used Yes 12/12/22 1502        Opportunity for questions and clarification was provided.       Patient transported with:   [employer]

## 2022-12-14 NOTE — ANESTHESIA POSTPROCEDURE EVALUATION
Procedure(s):  COLONOSCOPY.     MAC    Anesthesia Post Evaluation      Multimodal analgesia: multimodal analgesia not used between 6 hours prior to anesthesia start to PACU discharge  Patient location during evaluation: bedside  Patient participation: complete - patient participated  Level of consciousness: awake  Pain score: 0  Pain management: satisfactory to patient  Airway patency: patent  Anesthetic complications: no  Cardiovascular status: acceptable  Respiratory status: acceptable  Hydration status: acceptable  Post anesthesia nausea and vomiting:  controlled  Final Post Anesthesia Temperature Assessment:  Normothermia (36.0-37.5 degrees C)      INITIAL Post-op Vital signs:   Vitals Value Taken Time   /76 12/12/22 1750   Temp 36.3 °C (97.4 °F) 12/12/22 1750   Pulse 65 12/12/22 1750   Resp 15 12/12/22 1750   SpO2 98 % 12/12/22 1750

## 2022-12-15 LAB
INFLUENZA A AB, IGG 828524: 3.78 IV
INFLUENZA A VIRUS IGM: 4.24 IV
INFLUENZA B VIRUS AB, IGG: 2.49 IV
INFLUENZA B VIRUS AB, IGM: 2.03 IV

## 2023-03-31 ENCOUNTER — OFFICE VISIT (OUTPATIENT)
Dept: INTERNAL MEDICINE CLINIC | Age: 58
End: 2023-03-31

## 2023-03-31 VITALS
BODY MASS INDEX: 20.64 KG/M2 | RESPIRATION RATE: 14 BRPM | SYSTOLIC BLOOD PRESSURE: 110 MMHG | TEMPERATURE: 97.2 F | OXYGEN SATURATION: 97 % | WEIGHT: 136.2 LBS | HEART RATE: 72 BPM | HEIGHT: 68 IN | DIASTOLIC BLOOD PRESSURE: 51 MMHG

## 2023-03-31 DIAGNOSIS — N30.01 ACUTE CYSTITIS WITH HEMATURIA: Primary | ICD-10-CM

## 2023-03-31 LAB
BILIRUB UR QL STRIP: NEGATIVE
GLUCOSE UR-MCNC: NEGATIVE MG/DL
KETONES P FAST UR STRIP-MCNC: NEGATIVE MG/DL
PH UR STRIP: 6.5 [PH] (ref 4.6–8)
PROT UR QL STRIP: NEGATIVE
SP GR UR STRIP: 1.01 (ref 1–1.03)
UA UROBILINOGEN AMB POC: NORMAL (ref 0.2–1)
URINALYSIS CLARITY POC: CLEAR
URINALYSIS COLOR POC: YELLOW
URINE BLOOD POC: NORMAL
URINE LEUKOCYTES POC: NORMAL
URINE NITRITES POC: NEGATIVE

## 2023-03-31 RX ORDER — NITROFURANTOIN 25; 75 MG/1; MG/1
100 CAPSULE ORAL 2 TIMES DAILY
Qty: 10 CAPSULE | Refills: 0 | Status: SHIPPED | OUTPATIENT
Start: 2023-03-31 | End: 2023-04-05

## 2023-03-31 NOTE — PROGRESS NOTES
Note   Chief Complaint   Uti symptoms    Jacob Rashid is a 56 y.o. female     1. Acute cystitis with hematuria  -     nitrofurantoin, macrocrystal-monohydrate, (MACROBID) 100 mg capsule; Take 1 Capsule by mouth two (2) times a day for 5 days. , Normal, Disp-10 Capsule, R-0  -     AMB POC URINALYSIS DIP STICK AUTO W/ MICRO     Mild nausea a few days ago  +urinary frequency, urgency, dysuria  No fever, chill, abd pain, vomiting, back pain   Ua positive. Macrobid sent to pharmacy. Benefits, risks, possible drug interactions, and side effects of all new medications were reviewed with the patient. Pt verbalized understanding. Return to clinic:  as needed    An electronic signature was used to authenticate this note. Melly Antonio MD  [facility]  3/31/2023    No future appointments. Objective   Vitals:       Visit Vitals  BP (!) 110/51 (BP 1 Location: Left upper arm, BP Patient Position: Sitting, BP Cuff Size: Small adult)   Pulse 72   Temp 97.2 °F (36.2 °C) (Oral)   Resp 14   Ht 5' 8\" (1.727 m)   Wt 136 lb 3.2 oz (61.8 kg)   LMP 02/15/2023 (Approximate)   SpO2 97%   BMI 20.71 kg/m²        Physical Exam  Constitutional:       Appearance: Normal appearance. She is not ill-appearing. Cardiovascular:      Rate and Rhythm: Normal rate and regular rhythm. Heart sounds: No murmur heard. No friction rub. No gallop. Pulmonary:      Effort: No respiratory distress. Breath sounds: Normal breath sounds. No wheezing, rhonchi or rales. Abdominal:      Tenderness: There is no right CVA tenderness or left CVA tenderness. Neurological:      Mental Status: She is alert. Current Outpatient Medications   Medication Sig    nitrofurantoin, macrocrystal-monohydrate, (MACROBID) 100 mg capsule Take 1 Capsule by mouth two (2) times a day for 5 days. ascorbic acid, vitamin C, (VITAMIN C) 500 mg tablet Take 500 mg by mouth daily.     elderberry fruit (ELDERBERRY PO) Take 2 Tablets by mouth daily. mirtazapine (REMERON) 7.5 mg tablet Take 7.5 mg by mouth nightly. tamoxifen (NOLVADEX) 20 mg tablet Take 20 mg by mouth daily. cholecalciferol, vitamin D3, 50 mcg (2,000 unit) tab Take 1 Tablet by mouth daily. omega 3-dha-epa-fish oil (Fish Oil) 100-160-1,000 mg cap Take 1 Capsule by mouth daily. b complex-vitamin c-folic acid (DIALYVITE) 100-1 mg tab tablet Take 1 Tab by mouth daily. No current facility-administered medications for this visit.

## 2023-04-04 ENCOUNTER — TELEPHONE (OUTPATIENT)
Dept: INTERNAL MEDICINE CLINIC | Age: 58
End: 2023-04-04

## 2023-04-04 NOTE — TELEPHONE ENCOUNTER
Chief Complaint   Patient presents with    Mass     Right leg     Called patient and verified name and date of birth. Pt has hx of breast cancer  Reports it is on the right leg, near groin. Pt just noticed two weeks ago, painful upon touching. Not red, not warm, not visually noticeable but patient can feel it. Pt has not had anything like this on her leg. Offered appt for 4/11/23 at [address] [facility]. Pt took appt. Call or return to clinic prn if these symptoms worsen or fail to improve as anticipated, or seek ED or urgent care as needed if symptoms worsen.      Signed By: Tim Mcmahon     April 4, 2023

## 2023-04-04 NOTE — TELEPHONE ENCOUNTER
----- Message from Levi Morrison sent at 4/4/2023 10:19 AM EDT -----  Subject: Message to Provider    QUESTIONS  Information for Provider? Patient has been trying to reach the Clinical   staff line but could not reach the front. She has a lump on her leg that   she wants to speak to the nurse about. She will be in and out all day so   just leave a message  ---------------------------------------------------------------------------  --------------  [id number]  [phone]; OK to leave message on voicemail  ---------------------------------------------------------------------------  --------------  SCRIPT ANSWERS  Relationship to Patient?  Self

## 2023-04-17 NOTE — PROGRESS NOTES
"Chaplain Resident  Spiritual Health Services  Pager: [phone]     [facility] available 24/7 for emergent requests/referrals, either by having the on-call  paged or by entering an ASAP/STAT consult in Epic (this will also page the on-call ).   " Gastrointestinal Progress Note    12/11/2022    Admit Date: 12/9/2022    Subjective:     No new problems or symptoms; has decided to stay in hopes of colonoscopy tomorrow    Current Facility-Administered Medications   Medication Dose Route Frequency    dextrose 5% - 0.45% NaCl with KCl 40 mEq/L infusion   IntraVENous CONTINUOUS    sodium chloride (NS) flush 5-40 mL  5-40 mL IntraVENous Q8H    sodium chloride (NS) flush 5-40 mL  5-40 mL IntraVENous PRN    tamoxifen (NOLVADEX) tablet 20 mg  20 mg Oral QPM    mirtazapine (REMERON) tablet 7.5 mg  7.5 mg Oral QHS        Objective:     Blood pressure 111/62, pulse 81, temperature 98.1 °F (36.7 °C), resp. rate 16, height 5' 8\" (1.727 m), weight 63.4 kg (139 lb 12.4 oz), SpO2 95 %, not currently breastfeeding. No intake/output data recorded. 12/09 1901 - 12/11 0700  In: 2758.3 [P.O.:1410; I.V.:1348.3]  Out: -     EXAM:  GENERAL: alert, cooperative, no distress, HEART: regular rate and rhythm, LUNGS: chest clear, no wheezing, rales, normal symmetric air entry, ABDOMEN:  Bowel sounds are normal, liver is not enlarged, spleen is not enlarged and EXTREMITY: no edema      Data Review    Recent Results (from the past 24 hour(s))   HEMOGLOBIN    Collection Time: 12/11/22  9:36 AM   Result Value Ref Range    HGB 13.2 11.5 - 16.0 g/dL   RETICULOCYTE COUNT    Collection Time: 12/11/22  9:36 AM   Result Value Ref Range    Reticulocyte count 1.9 0.7 - 2.1 %    Absolute Retic Cnt. 0.0784 (H) 0.0164 - 0.0776 M/ul   LD    Collection Time: 12/11/22  9:36 AM   Result Value Ref Range     81 - 246 U/L   PROCALCITONIN    Collection Time: 12/11/22  9:36 AM   Result Value Ref Range    Procalcitonin <0.05 ng/mL   COVID-19 RAPID TEST    Collection Time: 12/11/22 10:59 AM   Result Value Ref Range    Specimen source Nasopharyngeal      COVID-19 rapid test Not detected NOTD         Assessment:     Principal Problem:    GI bleed (12/10/2022)        Plan:     1. Colonoscopy  2. Preparation today

## 2023-04-18 ENCOUNTER — HOSPITAL ENCOUNTER (OUTPATIENT)
Dept: ULTRASOUND IMAGING | Age: 58
Discharge: HOME | End: 2023-04-18
Attending: INTERNAL MEDICINE
Payer: COMMERCIAL

## 2023-04-18 DIAGNOSIS — R22.9 SKIN NODULE: ICD-10-CM

## 2023-04-18 PROCEDURE — 76882 US LMTD JT/FCL EVL NVASC XTR: CPT

## 2023-06-01 ENCOUNTER — OFFICE VISIT (OUTPATIENT)
Age: 58
End: 2023-06-01
Payer: COMMERCIAL

## 2023-06-01 VITALS
RESPIRATION RATE: 14 BRPM | SYSTOLIC BLOOD PRESSURE: 110 MMHG | WEIGHT: 134.8 LBS | OXYGEN SATURATION: 98 % | BODY MASS INDEX: 20.43 KG/M2 | DIASTOLIC BLOOD PRESSURE: 71 MMHG | HEIGHT: 68 IN | HEART RATE: 83 BPM

## 2023-06-01 DIAGNOSIS — N30.00 ACUTE CYSTITIS WITHOUT HEMATURIA: Primary | ICD-10-CM

## 2023-06-01 LAB
BILIRUBIN, URINE, POC: NEGATIVE
BLOOD URINE, POC: NEGATIVE
GLUCOSE URINE, POC: NEGATIVE
KETONES, URINE, POC: NEGATIVE
LEUKOCYTE ESTERASE, URINE, POC: ABNORMAL
NITRITE, URINE, POC: NEGATIVE
PH, URINE, POC: 5.5 (ref 4.6–8)
PROTEIN,URINE, POC: NEGATIVE
SPECIFIC GRAVITY, URINE, POC: 1.02 (ref 1–1.03)
URINALYSIS CLARITY, POC: CLEAR
URINALYSIS COLOR, POC: YELLOW
UROBILINOGEN, POC: NORMAL

## 2023-06-01 PROCEDURE — 99213 OFFICE O/P EST LOW 20 MIN: CPT | Performed by: INTERNAL MEDICINE

## 2023-06-01 PROCEDURE — 81001 URINALYSIS AUTO W/SCOPE: CPT | Performed by: INTERNAL MEDICINE

## 2023-06-01 RX ORDER — NITROFURANTOIN 25; 75 MG/1; MG/1
100 CAPSULE ORAL 2 TIMES DAILY
Qty: 10 CAPSULE | Refills: 0 | Status: SHIPPED | OUTPATIENT
Start: 2023-06-01 | End: 2023-06-06

## 2023-06-01 SDOH — ECONOMIC STABILITY: FOOD INSECURITY: WITHIN THE PAST 12 MONTHS, YOU WORRIED THAT YOUR FOOD WOULD RUN OUT BEFORE YOU GOT MONEY TO BUY MORE.: NEVER TRUE

## 2023-06-01 SDOH — ECONOMIC STABILITY: INCOME INSECURITY: HOW HARD IS IT FOR YOU TO PAY FOR THE VERY BASICS LIKE FOOD, HOUSING, MEDICAL CARE, AND HEATING?: NOT HARD AT ALL

## 2023-06-01 SDOH — ECONOMIC STABILITY: HOUSING INSECURITY
IN THE LAST 12 MONTHS, WAS THERE A TIME WHEN YOU DID NOT HAVE A STEADY PLACE TO SLEEP OR SLEPT IN A SHELTER (INCLUDING NOW)?: NO

## 2023-06-01 SDOH — ECONOMIC STABILITY: FOOD INSECURITY: WITHIN THE PAST 12 MONTHS, THE FOOD YOU BOUGHT JUST DIDN'T LAST AND YOU DIDN'T HAVE MONEY TO GET MORE.: NEVER TRUE

## 2023-06-01 ASSESSMENT — PATIENT HEALTH QUESTIONNAIRE - PHQ9
SUM OF ALL RESPONSES TO PHQ QUESTIONS 1-9: 0
2. FEELING DOWN, DEPRESSED OR HOPELESS: 0
SUM OF ALL RESPONSES TO PHQ9 QUESTIONS 1 & 2: 0
SUM OF ALL RESPONSES TO PHQ QUESTIONS 1-9: 0
1. LITTLE INTEREST OR PLEASURE IN DOING THINGS: 0

## 2023-06-01 NOTE — PROGRESS NOTES
Note   Chief Complaint   Uti symptoms    Emerita Schaeffer is a 66 y.o. female     1. Urinary frequency  -     AMB POC URINALYSIS DIP STICK AUTO W/ MICRO     Seen 3/31 for UTI symptoms, resolved after macrobid  Started getting symptoms again yesterday  +dysuria, frequency, discoloration, pelvic pain  No fevers, chills  +nausea, no vomiting  Reports having increased UTI cases in the last few years, says onc is attributing it to her tamoxifen, which she is coming off soon  UA with leuks. Macrobid sent to pharmacy, send ucx, advised pt if she developed another uti off tamoxifen, would rec further workup given frequency of infections    Benefits, risks, possible drug interactions, and side effects of all new medications were reviewed with the patient. Pt verbalized understanding. Return to clinic:  as needed    An electronic signature was used to authenticate this note. [place] MD  [facility]  6/1/2023    No future appointments. Objective   Vitals:       /71 (Site: Left Upper Arm, Position: Sitting, Cuff Size: Medium Adult)   Pulse 83   Resp 14   Ht 5' 8\" (1.727 m)   Wt 134 lb 12.8 oz (61.1 kg)   LMP  (LMP Unknown)   SpO2 98%   Breastfeeding No   BMI 20.50 kg/m²      Physical Exam  Constitutional:       Appearance: Normal appearance. She is not ill-appearing. Pulmonary:      Effort: No respiratory distress. Abdominal:      Tenderness: There is no right CVA tenderness or left CVA tenderness. Neurological:      Mental Status: She is alert. Current Outpatient Medications   Medication Sig    ascorbic acid (VITAMIN C) 500 MG tablet Take by mouth daily    Cholecalciferol 50 MCG (2000 UT) TABS Take 1 tablet by mouth daily    mirtazapine (REMERON) 7.5 MG tablet Take by mouth    tamoxifen (NOLVADEX) 20 MG tablet Take by mouth daily     No current facility-administered medications for this visit.

## 2023-06-02 ENCOUNTER — TELEPHONE (OUTPATIENT)
Age: 58
End: 2023-06-02

## 2023-06-02 LAB
BACTERIA SPEC CULT: NORMAL
CC UR VC: NORMAL
SERVICE CMNT-IMP: NORMAL

## 2023-06-06 ENCOUNTER — TELEPHONE (OUTPATIENT)
Age: 58
End: 2023-06-06

## 2023-06-06 DIAGNOSIS — R39.9 UTI SYMPTOMS: Primary | ICD-10-CM

## 2023-06-06 NOTE — TELEPHONE ENCOUNTER
Please advise that I spoke with Dr. Renetta Haas who recommends repeating the urine culture. Her first culture was negative, so we don't really have proof that she actually has a UTI, so I don't recommend another round of antibiotics.   If her culture is still negative, then Dr. Renetta Haas recommends scheduling an appointment with her to discuss further

## 2023-06-06 NOTE — TELEPHONE ENCOUNTER
Patient called stated with her on going sx of UTI. She would like to know after the abx is finished the next step d/t sx not improving with abx. Please advise.

## 2023-06-06 NOTE — TELEPHONE ENCOUNTER
Nurse called and spoke with patient , provided patient with detailed information regarding provider message with recommendations. Patient verbalized understanding and was thankful for the call.

## 2023-11-02 ENCOUNTER — OFFICE VISIT (OUTPATIENT)
Age: 58
End: 2023-11-02
Payer: COMMERCIAL

## 2023-11-02 VITALS
SYSTOLIC BLOOD PRESSURE: 125 MMHG | HEART RATE: 74 BPM | DIASTOLIC BLOOD PRESSURE: 77 MMHG | HEIGHT: 68 IN | BODY MASS INDEX: 21.16 KG/M2 | RESPIRATION RATE: 18 BRPM | WEIGHT: 139.6 LBS | OXYGEN SATURATION: 98 % | TEMPERATURE: 98.1 F

## 2023-11-02 DIAGNOSIS — J21.9 BRONCHIOLITIS: Primary | ICD-10-CM

## 2023-11-02 PROBLEM — K92.2 GI BLEED: Status: RESOLVED | Noted: 2022-12-10 | Resolved: 2023-11-02

## 2023-11-02 PROCEDURE — 99213 OFFICE O/P EST LOW 20 MIN: CPT | Performed by: INTERNAL MEDICINE

## 2023-11-02 RX ORDER — VITAMIN B COMPLEX
1 TABLET ORAL DAILY
COMMUNITY

## 2023-11-02 RX ORDER — AMOXICILLIN AND CLAVULANATE POTASSIUM 875; 125 MG/1; MG/1
1 TABLET, FILM COATED ORAL 2 TIMES DAILY
Qty: 14 TABLET | Refills: 0 | Status: SHIPPED | OUTPATIENT
Start: 2023-11-02 | End: 2023-11-09

## 2023-11-02 RX ORDER — BENZONATATE 200 MG/1
200 CAPSULE ORAL 3 TIMES DAILY PRN
Qty: 21 CAPSULE | Refills: 0 | Status: SHIPPED | OUTPATIENT
Start: 2023-11-02 | End: 2023-11-09

## 2023-11-02 RX ORDER — ALBUTEROL SULFATE 90 UG/1
2 AEROSOL, METERED RESPIRATORY (INHALATION) 4 TIMES DAILY PRN
Qty: 18 G | Refills: 0 | Status: SHIPPED | OUTPATIENT
Start: 2023-11-02

## 2023-11-02 RX ORDER — CHLORAL HYDRATE 500 MG
1 CAPSULE ORAL DAILY
COMMUNITY

## 2023-11-02 NOTE — PROGRESS NOTES
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 Presents with deep cough, congestion in chest for 10 days. Associated symptoms include: had fever, chills at onset. Only lasted a day or 2. Symptoms overall have improved, but she still does have a deep cough. No past history of significant pulmonary disease. Covid test neg 6 days ago. Treatments tried include: Over-the-counter decongestant. No hx of asthma, COPD. Pulse ox has been normal.    Tested + for flu B around 10/1/23, but the symptoms resolved. Review of Systems   All other systems reviewed and are negative, except as noted in HPI    Past Medical and Surgical History   has a past medical history of Cardiomyopathy ([address]), H/O endoscopy, Hemorrhoid, Hiatal hernia, and History of breast cancer. has a past surgical history that includes Breast lumpectomy (Left, 2018); Colonoscopy (N/A, 12/12/2022); and Colonoscopy (2017). Current Outpatient Medications   Medication Sig    Omega-3 Fatty Acids (FISH OIL) 1000 MG capsule Take 1 capsule by mouth daily    B Complex Vitamins (VITAMIN-B COMPLEX) TABS Take 1 tablet by mouth daily    Elderberry 500 MG CAPS Take 2 tablets by mouth daily    ascorbic acid (VITAMIN C) 500 MG tablet Take by mouth daily    Cholecalciferol 50 MCG (2000 UT) TABS Take 1 tablet by mouth daily    mirtazapine (REMERON) 7.5 MG tablet Take by mouth     No current facility-administered medications for this visit. reports that she has never smoked. She has never used smokeless tobacco. She reports that she does not drink alcohol and does not use drugs.     family history includes Cancer in her maternal grandmother and paternal grandmother; High Cholesterol in her father and mother; Hypertension in her father and mother; Osteoarthritis in her mother; Prostate

## 2023-11-06 ENCOUNTER — OFFICE VISIT (OUTPATIENT)
Age: 58
End: 2023-11-06
Payer: COMMERCIAL

## 2023-11-06 VITALS
HEART RATE: 68 BPM | TEMPERATURE: 98 F | DIASTOLIC BLOOD PRESSURE: 72 MMHG | RESPIRATION RATE: 14 BRPM | OXYGEN SATURATION: 98 % | WEIGHT: 139 LBS | SYSTOLIC BLOOD PRESSURE: 109 MMHG | HEIGHT: 68 IN | BODY MASS INDEX: 21.07 KG/M2

## 2023-11-06 DIAGNOSIS — Z00.00 WELL ADULT EXAM: ICD-10-CM

## 2023-11-06 DIAGNOSIS — Z00.00 WELL ADULT EXAM: Primary | ICD-10-CM

## 2023-11-06 LAB
ALBUMIN SERPL-MCNC: 3.9 G/DL (ref 3.5–5)
ALBUMIN/GLOB SERPL: 1 (ref 1.1–2.2)
ALP SERPL-CCNC: 105 U/L (ref 45–117)
ALT SERPL-CCNC: 51 U/L (ref 12–78)
ANION GAP SERPL CALC-SCNC: 5 MMOL/L (ref 5–15)
AST SERPL-CCNC: 35 U/L (ref 15–37)
BILIRUB SERPL-MCNC: 0.5 MG/DL (ref 0.2–1)
BUN SERPL-MCNC: 14 MG/DL (ref 6–20)
BUN/CREAT SERPL: 17 (ref 12–20)
CALCIUM SERPL-MCNC: 8.9 MG/DL (ref 8.5–10.1)
CHLORIDE SERPL-SCNC: 106 MMOL/L (ref 97–108)
CHOLEST SERPL-MCNC: 191 MG/DL
CO2 SERPL-SCNC: 30 MMOL/L (ref 21–32)
CREAT SERPL-MCNC: 0.81 MG/DL (ref 0.55–1.02)
EST. AVERAGE GLUCOSE BLD GHB EST-MCNC: 114 MG/DL
GLOBULIN SER CALC-MCNC: 4.1 G/DL (ref 2–4)
GLUCOSE SERPL-MCNC: 101 MG/DL (ref 65–100)
HBA1C MFR BLD: 5.6 % (ref 4–5.6)
HDLC SERPL-MCNC: 67 MG/DL
HDLC SERPL: 2.9 (ref 0–5)
LDLC SERPL CALC-MCNC: 108.6 MG/DL (ref 0–100)
POTASSIUM SERPL-SCNC: 4.3 MMOL/L (ref 3.5–5.1)
PROT SERPL-MCNC: 8 G/DL (ref 6.4–8.2)
SODIUM SERPL-SCNC: 141 MMOL/L (ref 136–145)
T4 FREE SERPL-MCNC: 0.9 NG/DL (ref 0.8–1.5)
TRIGL SERPL-MCNC: 77 MG/DL
TSH SERPL DL<=0.05 MIU/L-ACNC: 0.58 UIU/ML (ref 0.36–3.74)
VLDLC SERPL CALC-MCNC: 15.4 MG/DL

## 2023-11-06 PROCEDURE — 99396 PREV VISIT EST AGE 40-64: CPT | Performed by: INTERNAL MEDICINE

## 2023-11-06 ASSESSMENT — PATIENT HEALTH QUESTIONNAIRE - PHQ9
1. LITTLE INTEREST OR PLEASURE IN DOING THINGS: 0
SUM OF ALL RESPONSES TO PHQ QUESTIONS 1-9: 0
SUM OF ALL RESPONSES TO PHQ QUESTIONS 1-9: 0
SUM OF ALL RESPONSES TO PHQ9 QUESTIONS 1 & 2: 0
SUM OF ALL RESPONSES TO PHQ QUESTIONS 1-9: 0
2. FEELING DOWN, DEPRESSED OR HOPELESS: 0
SUM OF ALL RESPONSES TO PHQ QUESTIONS 1-9: 0

## 2024-02-09 ENCOUNTER — OFFICE VISIT (OUTPATIENT)
Age: 59
End: 2024-02-09
Payer: COMMERCIAL

## 2024-02-09 VITALS
HEART RATE: 67 BPM | BODY MASS INDEX: 21.07 KG/M2 | RESPIRATION RATE: 16 BRPM | OXYGEN SATURATION: 99 % | HEIGHT: 68 IN | WEIGHT: 139 LBS | TEMPERATURE: 97.8 F | DIASTOLIC BLOOD PRESSURE: 68 MMHG | SYSTOLIC BLOOD PRESSURE: 112 MMHG

## 2024-02-09 DIAGNOSIS — R31.29 MICROSCOPIC HEMATURIA: ICD-10-CM

## 2024-02-09 DIAGNOSIS — R35.0 URINARY FREQUENCY: Primary | ICD-10-CM

## 2024-02-09 PROBLEM — Z85.3 HISTORY OF LEFT BREAST CANCER: Status: ACTIVE | Noted: 2024-02-09

## 2024-02-09 LAB
BILIRUBIN, URINE, POC: NEGATIVE
BLOOD URINE, POC: NORMAL
GLUCOSE URINE, POC: NEGATIVE
KETONES, URINE, POC: NEGATIVE
LEUKOCYTE ESTERASE, URINE, POC: NEGATIVE
NITRITE, URINE, POC: NEGATIVE
PH, URINE, POC: 7 (ref 4.6–8)
PROTEIN,URINE, POC: NEGATIVE
SPECIFIC GRAVITY, URINE, POC: 1.02 (ref 1–1.03)
URINALYSIS CLARITY, POC: CLEAR
URINALYSIS COLOR, POC: YELLOW
UROBILINOGEN, POC: NORMAL

## 2024-02-09 PROCEDURE — 81002 URINALYSIS NONAUTO W/O SCOPE: CPT | Performed by: NURSE PRACTITIONER

## 2024-02-09 PROCEDURE — 99213 OFFICE O/P EST LOW 20 MIN: CPT | Performed by: NURSE PRACTITIONER

## 2024-02-09 RX ORDER — NITROFURANTOIN 25; 75 MG/1; MG/1
100 CAPSULE ORAL 2 TIMES DAILY
Qty: 20 CAPSULE | Refills: 0 | Status: SHIPPED | OUTPATIENT
Start: 2024-02-09 | End: 2024-02-19

## 2024-02-09 NOTE — PROGRESS NOTES
Neurological:      Mental Status: She is alert and oriented to person, place, and time.   Psychiatric:         Mood and Affect: Mood normal.         Behavior: Behavior normal.

## 2024-02-11 LAB
BACTERIA SPEC CULT: ABNORMAL
CC UR VC: ABNORMAL
SERVICE CMNT-IMP: ABNORMAL

## 2024-02-12 DIAGNOSIS — R31.9 URINARY TRACT INFECTION WITH HEMATURIA, SITE UNSPECIFIED: Primary | ICD-10-CM

## 2024-02-12 DIAGNOSIS — N39.0 URINARY TRACT INFECTION WITH HEMATURIA, SITE UNSPECIFIED: Primary | ICD-10-CM

## 2024-02-12 RX ORDER — AMOXICILLIN 500 MG/1
500 CAPSULE ORAL 2 TIMES DAILY
Qty: 14 CAPSULE | Refills: 0 | Status: SHIPPED | OUTPATIENT
Start: 2024-02-12 | End: 2024-02-19

## 2024-02-13 ENCOUNTER — TELEPHONE (OUTPATIENT)
Age: 59
End: 2024-02-13

## 2024-02-13 NOTE — TELEPHONE ENCOUNTER
The patient is requesting a call back to discuss a change on a antibiotic. She stated that she need detail on why the office called.  She stated that she will like a detail message to inform her. [phone]

## 2024-04-08 ENCOUNTER — OFFICE VISIT (OUTPATIENT)
Age: 59
End: 2024-04-08
Payer: COMMERCIAL

## 2024-04-08 VITALS
HEIGHT: 68 IN | DIASTOLIC BLOOD PRESSURE: 79 MMHG | WEIGHT: 142.8 LBS | BODY MASS INDEX: 21.64 KG/M2 | HEART RATE: 76 BPM | OXYGEN SATURATION: 96 % | TEMPERATURE: 98 F | RESPIRATION RATE: 14 BRPM | SYSTOLIC BLOOD PRESSURE: 115 MMHG

## 2024-04-08 DIAGNOSIS — R31.29 MICROSCOPIC HEMATURIA: Primary | ICD-10-CM

## 2024-04-08 DIAGNOSIS — R35.0 URINARY FREQUENCY: ICD-10-CM

## 2024-04-08 DIAGNOSIS — Z13.89 SCREENING FOR BLOOD OR PROTEIN IN URINE: ICD-10-CM

## 2024-04-08 LAB
BILIRUBIN, URINE, POC: NEGATIVE
BLOOD URINE, POC: NORMAL
GLUCOSE URINE, POC: NEGATIVE
KETONES, URINE, POC: NEGATIVE
LEUKOCYTE ESTERASE, URINE, POC: NEGATIVE
NITRITE, URINE, POC: NEGATIVE
PH, URINE, POC: 5.5 (ref 4.6–8)
PROTEIN,URINE, POC: NEGATIVE
SPECIFIC GRAVITY, URINE, POC: 1.01 (ref 1–1.03)
URINALYSIS CLARITY, POC: CLEAR
URINALYSIS COLOR, POC: YELLOW
UROBILINOGEN, POC: NORMAL

## 2024-04-08 PROCEDURE — 81003 URINALYSIS AUTO W/O SCOPE: CPT | Performed by: INTERNAL MEDICINE

## 2024-04-08 PROCEDURE — 99212 OFFICE O/P EST SF 10 MIN: CPT | Performed by: INTERNAL MEDICINE

## 2024-04-08 ASSESSMENT — PATIENT HEALTH QUESTIONNAIRE - PHQ9
2. FEELING DOWN, DEPRESSED OR HOPELESS: NOT AT ALL
SUM OF ALL RESPONSES TO PHQ9 QUESTIONS 1 & 2: 0
SUM OF ALL RESPONSES TO PHQ QUESTIONS 1-9: 0
1. LITTLE INTEREST OR PLEASURE IN DOING THINGS: NOT AT ALL
SUM OF ALL RESPONSES TO PHQ QUESTIONS 1-9: 0

## 2024-04-08 NOTE — PROGRESS NOTES
(Oral)   Resp 14   Ht 1.727 m (5' 8\")   Wt 64.8 kg (142 lb 12.8 oz)   SpO2 96%   BMI 21.71 kg/m²      General: well nourished, well appearing woman, in no distress  Heart: regular, normal rate, no murmurs noted  Abdomen: soft, non-tender, normal bowel sounds, no masses or organomegaly noted  : no CVA tenderness       Results for orders placed or performed in visit on 04/08/24   AMB POC URINALYSIS DIP STICK AUTO W/O MICRO   Result Value Ref Range    Color, Urine, POC Yellow     Clarity, Urine, POC Clear     Glucose, Urine, POC Negative     Bilirubin, Urine, POC Negative     Ketones, Urine, POC Negative     Specific Gravity, Urine, POC 1.015 1.001 - 1.035    Blood, Urine, POC Trace     pH, Urine, POC 5.5 4.6 - 8.0    Protein, Urine, POC Negative     Urobilinogen, POC 0.2 mg/dL     Nitrite, Urine, POC Negative     Leukocyte Esterase, Urine, POC Negative           This note was created with the help of speech recognition software (Dragon) and may contain some 'sound alike' errors.

## 2024-04-09 DIAGNOSIS — R31.29 MICROSCOPIC HEMATURIA: ICD-10-CM

## 2024-04-10 LAB
BACTERIA SPEC CULT: NORMAL
SERVICE CMNT-IMP: NORMAL

## 2024-04-30 ENCOUNTER — OFFICE VISIT (OUTPATIENT)
Age: 59
End: 2024-04-30
Payer: COMMERCIAL

## 2024-04-30 VITALS
DIASTOLIC BLOOD PRESSURE: 73 MMHG | HEIGHT: 68 IN | WEIGHT: 142.8 LBS | BODY MASS INDEX: 21.64 KG/M2 | OXYGEN SATURATION: 97 % | SYSTOLIC BLOOD PRESSURE: 116 MMHG | HEART RATE: 72 BPM | RESPIRATION RATE: 14 BRPM

## 2024-04-30 DIAGNOSIS — R20.2 PARESTHESIA: ICD-10-CM

## 2024-04-30 DIAGNOSIS — F41.9 ANXIETY: ICD-10-CM

## 2024-04-30 DIAGNOSIS — R20.2 PARESTHESIA: Primary | ICD-10-CM

## 2024-04-30 LAB
ALBUMIN SERPL-MCNC: 4.1 G/DL (ref 3.5–5)
ALBUMIN/GLOB SERPL: 1 (ref 1.1–2.2)
ALP SERPL-CCNC: 110 U/L (ref 45–117)
ALT SERPL-CCNC: 44 U/L (ref 12–78)
ANION GAP SERPL CALC-SCNC: 4 MMOL/L (ref 5–15)
AST SERPL-CCNC: 28 U/L (ref 15–37)
BASOPHILS # BLD: 0 K/UL (ref 0–0.1)
BASOPHILS NFR BLD: 0 % (ref 0–1)
BILIRUB SERPL-MCNC: 0.6 MG/DL (ref 0.2–1)
BUN SERPL-MCNC: 19 MG/DL (ref 6–20)
BUN/CREAT SERPL: 22 (ref 12–20)
CALCIUM SERPL-MCNC: 9.8 MG/DL (ref 8.5–10.1)
CHLORIDE SERPL-SCNC: 104 MMOL/L (ref 97–108)
CO2 SERPL-SCNC: 29 MMOL/L (ref 21–32)
CREAT SERPL-MCNC: 0.88 MG/DL (ref 0.55–1.02)
DIFFERENTIAL METHOD BLD: NORMAL
EOSINOPHIL # BLD: 0 K/UL (ref 0–0.4)
EOSINOPHIL NFR BLD: 1 % (ref 0–7)
ERYTHROCYTE [DISTWIDTH] IN BLOOD BY AUTOMATED COUNT: 13.2 % (ref 11.5–14.5)
EST. AVERAGE GLUCOSE BLD GHB EST-MCNC: 105 MG/DL
FOLATE SERPL-MCNC: 24.2 NG/ML (ref 5–21)
GLOBULIN SER CALC-MCNC: 4.1 G/DL (ref 2–4)
GLUCOSE SERPL-MCNC: 102 MG/DL (ref 65–100)
HBA1C MFR BLD: 5.3 % (ref 4–5.6)
HCT VFR BLD AUTO: 44.4 % (ref 35–47)
HGB BLD-MCNC: 14.3 G/DL (ref 11.5–16)
IMM GRANULOCYTES # BLD AUTO: 0 K/UL (ref 0–0.04)
IMM GRANULOCYTES NFR BLD AUTO: 0 % (ref 0–0.5)
LYMPHOCYTES # BLD: 2 K/UL (ref 0.8–3.5)
LYMPHOCYTES NFR BLD: 30 % (ref 12–49)
MCH RBC QN AUTO: 30.3 PG (ref 26–34)
MCHC RBC AUTO-ENTMCNC: 32.2 G/DL (ref 30–36.5)
MCV RBC AUTO: 94.1 FL (ref 80–99)
MONOCYTES # BLD: 0.4 K/UL (ref 0–1)
MONOCYTES NFR BLD: 6 % (ref 5–13)
NEUTS SEG # BLD: 4.2 K/UL (ref 1.8–8)
NEUTS SEG NFR BLD: 63 % (ref 32–75)
NRBC # BLD: 0 K/UL (ref 0–0.01)
NRBC BLD-RTO: 0 PER 100 WBC
PLATELET # BLD AUTO: 285 K/UL (ref 150–400)
PMV BLD AUTO: 10.5 FL (ref 8.9–12.9)
POTASSIUM SERPL-SCNC: 4.5 MMOL/L (ref 3.5–5.1)
PROT SERPL-MCNC: 8.2 G/DL (ref 6.4–8.2)
RBC # BLD AUTO: 4.72 M/UL (ref 3.8–5.2)
SODIUM SERPL-SCNC: 137 MMOL/L (ref 136–145)
T4 FREE SERPL-MCNC: 0.9 NG/DL (ref 0.8–1.5)
TSH SERPL DL<=0.05 MIU/L-ACNC: 0.95 UIU/ML (ref 0.36–3.74)
VIT B12 SERPL-MCNC: 882 PG/ML (ref 193–986)
WBC # BLD AUTO: 6.7 K/UL (ref 3.6–11)

## 2024-04-30 PROCEDURE — 99215 OFFICE O/P EST HI 40 MIN: CPT | Performed by: INTERNAL MEDICINE

## 2024-04-30 ASSESSMENT — PATIENT HEALTH QUESTIONNAIRE - PHQ9
SUM OF ALL RESPONSES TO PHQ QUESTIONS 1-9: 0
2. FEELING DOWN, DEPRESSED OR HOPELESS: NOT AT ALL
1. LITTLE INTEREST OR PLEASURE IN DOING THINGS: NOT AT ALL
SUM OF ALL RESPONSES TO PHQ QUESTIONS 1-9: 0
SUM OF ALL RESPONSES TO PHQ9 QUESTIONS 1 & 2: 0

## 2024-05-02 ENCOUNTER — TELEPHONE (OUTPATIENT)
Age: 59
End: 2024-05-02

## 2024-05-02 LAB — CERULOPLASMIN SERPL-MCNC: 28.7 MG/DL (ref 19–39)

## 2024-05-02 NOTE — TELEPHONE ENCOUNTER
Patient called again wanting to talk with the Dr or nurse about her lab results    Jie [phone]

## 2024-05-23 ENCOUNTER — OFFICE VISIT (OUTPATIENT)
Age: 59
End: 2024-05-23
Payer: COMMERCIAL

## 2024-05-23 VITALS
RESPIRATION RATE: 16 BRPM | OXYGEN SATURATION: 98 % | WEIGHT: 140 LBS | DIASTOLIC BLOOD PRESSURE: 72 MMHG | HEART RATE: 64 BPM | TEMPERATURE: 98.1 F | SYSTOLIC BLOOD PRESSURE: 110 MMHG | BODY MASS INDEX: 21.22 KG/M2 | HEIGHT: 68 IN

## 2024-05-23 DIAGNOSIS — B95.0 GROUP A STREPTOCOCCAL INFECTION: Primary | ICD-10-CM

## 2024-05-23 LAB
GROUP A STREP ANTIGEN, POC: POSITIVE
VALID INTERNAL CONTROL, POC: YES

## 2024-05-23 PROCEDURE — 87880 STREP A ASSAY W/OPTIC: CPT | Performed by: NURSE PRACTITIONER

## 2024-05-23 PROCEDURE — 99213 OFFICE O/P EST LOW 20 MIN: CPT | Performed by: NURSE PRACTITIONER

## 2024-05-23 RX ORDER — AMOXICILLIN AND CLAVULANATE POTASSIUM 875; 125 MG/1; MG/1
1 TABLET, FILM COATED ORAL 2 TIMES DAILY
Qty: 14 TABLET | Refills: 0 | Status: SHIPPED | OUTPATIENT
Start: 2024-05-23 | End: 2024-05-30

## 2024-05-23 ASSESSMENT — ENCOUNTER SYMPTOMS
EYE PAIN: 0
SHORTNESS OF BREATH: 0
DIARRHEA: 0
NAUSEA: 0
EYES NEGATIVE: 1
EYE REDNESS: 0
GASTROINTESTINAL NEGATIVE: 1
SINUS PRESSURE: 0
SORE THROAT: 1
COUGH: 0
RESPIRATORY NEGATIVE: 1
ABDOMINAL PAIN: 0
SINUS PAIN: 0
CHEST TIGHTNESS: 0
BACK PAIN: 0
CONSTIPATION: 0
VOMITING: 0
BLOOD IN STOOL: 0
RHINORRHEA: 0

## 2024-05-23 NOTE — PROGRESS NOTES
Assessment and Plan     1. Group A streptococcal infection: Positive strep A in office. Will start treatment with Augmentin, mode of use and possible side effects discussed. Rest and hydration recommended. Tylenol or Ibuprofen for pain as needed recommended. Return instructions given. Pt verbalized understanding.   -     amoxicillin-clavulanate (AUGMENTIN) 875-125 MG per tablet; Take 1 tablet by mouth 2 times daily for 7 days, Disp-14 tablet, R-0Normal       Benefits, risks, possible drug interactions, and side effects of all new medications were reviewed with the patient. Pt verbalized understanding.    An electronic signature was used to authenticate this note.  Dorene Dalton, APRN - CNP  5/23/2024      Follow-up and Dispositions    Return if symptoms worsen or fail to improve.          History of Present Illness   Chief Complaint     Jie Rodriguez is a 59 y.o. female here for had concerns including Pharyngitis.   Mrs. Rodriguez presents today with reports of sore throat, fatigue, postnasal drip, nausea, onset on 3 days ago. Has not had diarrhea or vomiting. Symptoms have somehow improved. Has not taken any medications for symptoms. Denies sick contacts, but went to 2 graduation parties over the weekend and had visitors. Denies cough, ear pain, fever, body aches, shortness of breath.    Review of Systems  Review of Systems   Constitutional: Negative.  Negative for chills, fatigue, fever and unexpected weight change.   HENT:  Positive for postnasal drip and sore throat. Negative for congestion, ear discharge, ear pain, rhinorrhea, sinus pressure and sinus pain.    Eyes: Negative.  Negative for pain, redness and visual disturbance.   Respiratory: Negative.  Negative for cough, chest tightness and shortness of breath.    Cardiovascular: Negative.  Negative for chest pain and palpitations.   Gastrointestinal: Negative.  Negative for abdominal pain, blood in stool, constipation, diarrhea, nausea and vomiting.

## 2024-07-31 ENCOUNTER — OFFICE VISIT (OUTPATIENT)
Age: 59
End: 2024-07-31
Payer: COMMERCIAL

## 2024-07-31 VITALS
BODY MASS INDEX: 21.07 KG/M2 | OXYGEN SATURATION: 98 % | HEIGHT: 68 IN | DIASTOLIC BLOOD PRESSURE: 64 MMHG | SYSTOLIC BLOOD PRESSURE: 98 MMHG | WEIGHT: 139 LBS | TEMPERATURE: 98.1 F | RESPIRATION RATE: 16 BRPM | HEART RATE: 67 BPM

## 2024-07-31 DIAGNOSIS — J02.9 SORE THROAT: Primary | ICD-10-CM

## 2024-07-31 DIAGNOSIS — M54.2 TENDERNESS OF NECK: ICD-10-CM

## 2024-07-31 LAB
GROUP A STREP ANTIGEN, POC: NEGATIVE
VALID INTERNAL CONTROL, POC: YES

## 2024-07-31 PROCEDURE — 99213 OFFICE O/P EST LOW 20 MIN: CPT | Performed by: NURSE PRACTITIONER

## 2024-07-31 PROCEDURE — 87880 STREP A ASSAY W/OPTIC: CPT | Performed by: NURSE PRACTITIONER

## 2024-07-31 SDOH — ECONOMIC STABILITY: FOOD INSECURITY: WITHIN THE PAST 12 MONTHS, YOU WORRIED THAT YOUR FOOD WOULD RUN OUT BEFORE YOU GOT MONEY TO BUY MORE.: NEVER TRUE

## 2024-07-31 SDOH — ECONOMIC STABILITY: FOOD INSECURITY: WITHIN THE PAST 12 MONTHS, THE FOOD YOU BOUGHT JUST DIDN'T LAST AND YOU DIDN'T HAVE MONEY TO GET MORE.: NEVER TRUE

## 2024-07-31 SDOH — ECONOMIC STABILITY: INCOME INSECURITY: HOW HARD IS IT FOR YOU TO PAY FOR THE VERY BASICS LIKE FOOD, HOUSING, MEDICAL CARE, AND HEATING?: NOT HARD AT ALL

## 2024-07-31 ASSESSMENT — ENCOUNTER SYMPTOMS
EYE PAIN: 0
SORE THROAT: 1
CHEST TIGHTNESS: 0
BLOOD IN STOOL: 0
COUGH: 0
BACK PAIN: 0
RHINORRHEA: 0
SINUS PAIN: 0
ABDOMINAL PAIN: 0
VOMITING: 0
CONSTIPATION: 0
EYE REDNESS: 0
GASTROINTESTINAL NEGATIVE: 1
EYES NEGATIVE: 1
SINUS PRESSURE: 0
DIARRHEA: 0
NAUSEA: 0
SHORTNESS OF BREATH: 0
RESPIRATORY NEGATIVE: 1

## 2024-07-31 NOTE — PROGRESS NOTES
Assessment and Plan     1. Sore throat: Strep A test in office negative. Pt describes pain as burning sensation, recommended PPI and try to avoid foods that could trigger symptoms. Declines prescription, will buy OTC Omeprazole and take daily. Return instructions given. Pt verbalized understanding.   -     AMB POC RAPID STREP A  2. Tenderness of neck: Most recent thyroid function studies this year were normal. Given tenderness to anterior neck area and reported symptoms imaging studies ordered. Acetaminophen for pain as needed. Return instructions given. Pt verbalized understanding.   -     US HEAD NECK SOFT TISSUE THYROID; Future     Tests Preformed During Visit:    Results for orders placed or performed in visit on 07/31/24   AMB POC RAPID STREP A   Result Value Ref Range    Valid Internal Control, POC YES     Group A Strep Antigen, POC Negative       Benefits, risks, possible drug interactions, and side effects of all new medications were reviewed with the patient. Pt verbalized understanding.    An electronic signature was used to authenticate this note.  Dorene Dalton, APRN - CNP  7/31/2024      Follow-up and Dispositions    Return if symptoms worsen or fail to improve.          History of Present Illness   Chief Complaint     Jie Rodriguez is a 59 y.o. female here for had concerns including Laryngitis.   Mrs. Rodriguez presents today with reports of sore throat, onset 2 weeks ago. Describe as a burning sensation. Symptoms exacerbate at night time. History of chronic dry mouth. She was diagnosed with strep A infection 2 months ago, compliance abx treatment with symptoms resolution. She drinks cold water helps with pain. Denies difficulty swallowing, epigastric pain, nausea, vomiting.       Review of Systems  Review of Systems   Constitutional: Negative.  Negative for chills, fatigue, fever and unexpected weight change.   HENT:  Positive for sore throat. Negative for congestion, ear discharge, ear

## 2024-08-07 ENCOUNTER — HOSPITAL ENCOUNTER (OUTPATIENT)
Facility: HOSPITAL | Age: 59
Discharge: HOME | End: 2024-08-10
Payer: COMMERCIAL

## 2024-08-07 DIAGNOSIS — M54.2 TENDERNESS OF NECK: ICD-10-CM

## 2024-08-07 PROCEDURE — 76536 US EXAM OF HEAD AND NECK: CPT

## 2024-09-06 ENCOUNTER — HOSPITAL ENCOUNTER (OUTPATIENT)
Facility: HOSPITAL | Age: 59
Discharge: HOME | End: 2024-09-08
Payer: COMMERCIAL

## 2024-09-06 VITALS
BODY MASS INDEX: 21.07 KG/M2 | SYSTOLIC BLOOD PRESSURE: 128 MMHG | HEIGHT: 68 IN | DIASTOLIC BLOOD PRESSURE: 96 MMHG | WEIGHT: 139 LBS

## 2024-09-06 DIAGNOSIS — R00.2 PALPITATIONS: ICD-10-CM

## 2024-09-06 LAB
ECHO AO ARCH DIAM: 2.2 CM
ECHO AO ASC DIAM: 2.7 CM
ECHO AO ASCENDING AORTA INDEX: 1.54 CM/M2
ECHO AO ROOT DIAM: 3 CM
ECHO AO ROOT INDEX: 1.71 CM/M2
ECHO AV AREA PEAK VELOCITY: 3.2 CM2
ECHO AV AREA/BSA PEAK VELOCITY: 1.8 CM2/M2
ECHO AV PEAK GRADIENT: 6 MMHG
ECHO AV PEAK VELOCITY: 1.3 M/S
ECHO AV VELOCITY RATIO: 0.85
ECHO BSA: 1.74 M2
ECHO LA DIAMETER INDEX: 1.49 CM/M2
ECHO LA DIAMETER: 2.6 CM
ECHO LA TO AORTIC ROOT RATIO: 0.87
ECHO LA VOL A-L A2C: 18 ML (ref 22–52)
ECHO LA VOL A-L A4C: 30 ML (ref 22–52)
ECHO LA VOL BP: 21 ML (ref 22–52)
ECHO LA VOL MOD A2C: 17 ML (ref 22–52)
ECHO LA VOL MOD A4C: 24 ML (ref 22–52)
ECHO LA VOL/BSA BIPLANE: 12 ML/M2 (ref 16–34)
ECHO LA VOLUME AREA LENGTH: 24 ML
ECHO LA VOLUME INDEX A-L A2C: 10 ML/M2 (ref 16–34)
ECHO LA VOLUME INDEX A-L A4C: 17 ML/M2 (ref 16–34)
ECHO LA VOLUME INDEX AREA LENGTH: 14 ML/M2 (ref 16–34)
ECHO LA VOLUME INDEX MOD A2C: 10 ML/M2 (ref 16–34)
ECHO LA VOLUME INDEX MOD A4C: 14 ML/M2 (ref 16–34)
ECHO LV E' LATERAL VELOCITY: 7 CM/S
ECHO LV E' SEPTAL VELOCITY: 6 CM/S
ECHO LV EDV A2C: 72 ML
ECHO LV EDV A4C: 75 ML
ECHO LV EDV BP: 74 ML (ref 56–104)
ECHO LV EDV INDEX A4C: 43 ML/M2
ECHO LV EDV INDEX BP: 42 ML/M2
ECHO LV EDV NDEX A2C: 41 ML/M2
ECHO LV EJECTION FRACTION A2C: 64 %
ECHO LV EJECTION FRACTION A4C: 50 %
ECHO LV EJECTION FRACTION BIPLANE: 58 % (ref 55–100)
ECHO LV ESV A2C: 26 ML
ECHO LV ESV A4C: 38 ML
ECHO LV ESV BP: 31 ML (ref 19–49)
ECHO LV ESV INDEX A2C: 15 ML/M2
ECHO LV ESV INDEX A4C: 22 ML/M2
ECHO LV ESV INDEX BP: 18 ML/M2
ECHO LV FRACTIONAL SHORTENING: 29 % (ref 28–44)
ECHO LV INTERNAL DIMENSION DIASTOLE INDEX: 2.34 CM/M2
ECHO LV INTERNAL DIMENSION DIASTOLIC: 4.1 CM (ref 3.9–5.3)
ECHO LV INTERNAL DIMENSION SYSTOLIC INDEX: 1.66 CM/M2
ECHO LV INTERNAL DIMENSION SYSTOLIC: 2.9 CM
ECHO LV IVSD: 0.6 CM (ref 0.6–0.9)
ECHO LV MASS 2D: 67.1 G (ref 67–162)
ECHO LV MASS INDEX 2D: 38.4 G/M2 (ref 43–95)
ECHO LV POSTERIOR WALL DIASTOLIC: 0.6 CM (ref 0.6–0.9)
ECHO LV RELATIVE WALL THICKNESS RATIO: 0.29
ECHO LVOT AREA: 3.8 CM2
ECHO LVOT DIAM: 2.2 CM
ECHO LVOT MEAN GRADIENT: 2 MMHG
ECHO LVOT PEAK GRADIENT: 5 MMHG
ECHO LVOT PEAK VELOCITY: 1.1 M/S
ECHO LVOT STROKE VOLUME INDEX: 43.9 ML/M2
ECHO LVOT SV: 76.7 ML
ECHO LVOT VTI: 20.2 CM
ECHO MV A VELOCITY: 0.68 M/S
ECHO MV E DECELERATION TIME (DT): 232 MS
ECHO MV E VELOCITY: 0.7 M/S
ECHO MV E/A RATIO: 1.03
ECHO MV E/E' LATERAL: 10
ECHO MV E/E' RATIO (AVERAGED): 10.83
ECHO MV E/E' SEPTAL: 11.67
ECHO MV REGURGITANT PEAK GRADIENT: 85 MMHG
ECHO MV REGURGITANT PEAK VELOCITY: 4.6 M/S
ECHO PULMONARY ARTERY END DIASTOLIC PRESSURE: 5 MMHG
ECHO PV MAX VELOCITY: 0.9 M/S
ECHO PV PEAK GRADIENT: 3 MMHG
ECHO PV REGURGITANT MAX VELOCITY: 1.1 M/S
ECHO RV FREE WALL PEAK S': 10 CM/S
ECHO RV INTERNAL DIMENSION: 3.2 CM
ECHO RV TAPSE: 1.9 CM (ref 1.7–?)
ECHO TV REGURGITANT MAX VELOCITY: 2.42 M/S
ECHO TV REGURGITANT PEAK GRADIENT: 23 MMHG

## 2024-09-06 PROCEDURE — 93306 TTE W/DOPPLER COMPLETE: CPT | Performed by: STUDENT IN AN ORGANIZED HEALTH CARE EDUCATION/TRAINING PROGRAM

## 2024-09-06 PROCEDURE — 93306 TTE W/DOPPLER COMPLETE: CPT

## 2025-02-26 ENCOUNTER — TELEPHONE (OUTPATIENT)
Facility: CLINIC | Age: 60
End: 2025-02-26

## 2025-02-26 NOTE — TELEPHONE ENCOUNTER
----- Message from Jesús BASSETT sent at 2/26/2025  1:26 PM EST -----  Regarding: [facility] Appointment Request  [facility] Appointment Request    Patient needs appointment for [facility] Appointment Type: Annual Visit.    Patient Requested Dates(s):Preferred the week of March 17, 2025  Patient Requested Time: Earliest or latest availability  Provider Name:Pavithra Guardado MD     Reason for Appointment Request: Established Patient - Available appointments did not meet patient need  --------------------------------------------------------------------------------------------------------------------------    Relationship to Patient: Self     Call Back Information: OK to leave message on voicemail  Preferred Call Back Number: [phone]

## 2025-02-27 NOTE — TELEPHONE ENCOUNTER
FYI: PSR attempted to reach out to patient to schedule -no answer, left detailed VM for call back and sent my chart message

## 2025-04-10 ENCOUNTER — TELEPHONE (OUTPATIENT)
Facility: CLINIC | Age: 60
End: 2025-04-10

## 2025-04-10 DIAGNOSIS — N30.00 ACUTE CYSTITIS WITHOUT HEMATURIA: Primary | ICD-10-CM

## 2025-04-10 RX ORDER — NITROFURANTOIN 25; 75 MG/1; MG/1
100 CAPSULE ORAL 2 TIMES DAILY
Qty: 10 CAPSULE | Refills: 0 | Status: SHIPPED | OUTPATIENT
Start: 2025-04-10 | End: 2025-04-15

## (undated) DEVICE — Device

## (undated) DEVICE — SYRINGE 50ML E/T

## (undated) DEVICE — 1200 GUARD II KIT W/5MM TUBE W/O VAC TUBE: Brand: GUARDIAN

## (undated) DEVICE — CATH IV AUTOGRD BC PNK 20GA 25 -- INSYTE

## (undated) DEVICE — KIT COLON W/ 1.1OZ LUB AND 2 END

## (undated) DEVICE — BAG BELONG PT PERS CLEAR HANDL

## (undated) DEVICE — CANN NASAL O2 CAPNOGRAPHY AD -- FILTERLINE

## (undated) DEVICE — (D)SENSOR RMFG 02 PULS OXMTR -- DISC BY MFR USE ITEM 133445

## (undated) DEVICE — SOLIDIFIER MEDC 1200ML -- CONVERT TO 356117

## (undated) DEVICE — SET GRAV CK VLV NEEDLESS ST 3 GANGED 4WAY STPCOCK HI FLO 10

## (undated) DEVICE — ELECTRODE,RADIOTRANSLUCENT,FOAM,3PK: Brand: MEDLINE

## (undated) DEVICE — CUFF RMFG BP INF SZ 11 DISP -- LAWSON OEM ITEM 238915

## (undated) DEVICE — 3M™ CUROS™ DISINFECTING CAP FOR NEEDLELESS CONNECTORS 270/CARTON 20 CARTONS/CASE CFF1-270: Brand: CUROS™

## (undated) DEVICE — SIMPLICITY FLUFF UNDERPAD 23X36, MODERATE: Brand: SIMPLICITY